# Patient Record
Sex: MALE | Race: WHITE | NOT HISPANIC OR LATINO | Employment: UNEMPLOYED | URBAN - METROPOLITAN AREA
[De-identification: names, ages, dates, MRNs, and addresses within clinical notes are randomized per-mention and may not be internally consistent; named-entity substitution may affect disease eponyms.]

---

## 2018-06-12 ENCOUNTER — HISTORICAL (OUTPATIENT)
Dept: SURGERY | Facility: HOSPITAL | Age: 48
End: 2018-06-12

## 2018-06-22 ENCOUNTER — HISTORICAL (OUTPATIENT)
Dept: RADIOLOGY | Facility: HOSPITAL | Age: 48
End: 2018-06-22

## 2018-06-25 ENCOUNTER — HISTORICAL (OUTPATIENT)
Dept: ANESTHESIOLOGY | Facility: HOSPITAL | Age: 48
End: 2018-06-25

## 2018-12-28 ENCOUNTER — HOSPITAL ENCOUNTER (OUTPATIENT)
Dept: MEDSURG UNIT | Facility: HOSPITAL | Age: 48
End: 2018-12-29
Attending: INTERNAL MEDICINE | Admitting: INTERNAL MEDICINE

## 2020-09-22 ENCOUNTER — HISTORICAL (OUTPATIENT)
Dept: RADIOLOGY | Facility: HOSPITAL | Age: 50
End: 2020-09-22

## 2020-09-23 ENCOUNTER — HISTORICAL (OUTPATIENT)
Dept: LAB | Facility: HOSPITAL | Age: 50
End: 2020-09-23

## 2021-05-27 ENCOUNTER — HISTORICAL (OUTPATIENT)
Dept: LAB | Facility: HOSPITAL | Age: 51
End: 2021-05-27

## 2022-05-02 NOTE — HISTORICAL [facility]
This is a historical note converted from Gee. Formatting and pictures may have been removed.  Please reference Gee for original formatting and attached multimedia. Chief Complaint  Pt c/o htn and chest pressure with intermittent feeling of spasms on left side of chest. Pt talking freely, skin w/d. Pt took his b/p medication today.  History of Present Illness  49yo male presents to the ED c/o chest tightness on the left.? H states that it feels like spasms on his left side.? He does describe a chronic cough which has been present for the last 1 year.? He does smoke.? He denies any fever/chills.? No N/V/D/C.? No SOB.? His BP was accelerated upon admit at 181/100.? His first set of cardiac enzymes were negative.? CXR and EKG were negative.? He will be admitted for serial enzymes and telemetry monitoring.  Review of Systems  ?  ?????Constitutional: ?No fever, No chills, No sweats, No weakness, No fatigue. ?  ?????Eye: ?No double vision, No dry eyes, No photophobia. ?  ?????Ear/Nose/Mouth/Throat: ?No ear pain, No nasal congestion, No sore throat. ?  ?????Respiratory: ?No shortness of breath, No cough, No sputum production, No hemoptysis, No wheezing, No pleuritic pain. ?  ?????Cardiovascular:??+ chest pain, No palpitations, No peripheral edema, No syncope. ?  ?????Gastrointestinal: ?No nausea, No vomiting, No diarrhea, No constipation, No heartburn, No abdominal pain. ?  ?????Genitourinary: ?No dysuria, No hematuria, No change in urine stream. ?  ?????Hematology/Lymphatics: ?No anemia, No bruising tendency, No bruise, No hemorrhage, No petechiae. ?  ?????Endocrine: ?No excessive thirst, No polyuria, No cold intolerance. ?  ?????Immunologic: ?No recurrent fevers, No recurrent infections. ?  ?????Musculoskeletal: ?Joint pain, No back pain, No muscle pain, No decreased range of motion. ?  ?????Integumentary: ?No rash, No pruritus, No petechiae, No skin lesion. ?  ?????Neurologic: ?Alert and oriented X4, No abnormal  balance, No confusion, No tingling. ?  ?????Psychiatric: ?No anxiety, No depression. ?  Physical Exam  Vitals & Measurements  T:?37.0? ?C (Temporal Artery)? TMIN:?36.4? ?C (Oral)? TMAX:?37.0? ?C (Temporal Artery)? HR:?78(Monitored)? RR:?18? BP:?146/84? SpO2:?97%? WT:?88.6?kg?  General: ?Alert and oriented, No acute distress. ?  ??????? ? Appearance: Well nourished. ?  ??????? ? Behavior: Appropriate. ?  ??????? ? Skin: Normal for ethnicity. ?  ?????Eye: ?Pupils are equal, round and reactive to light, Extraocular movements are intact. ?  ?????HENT: ?Normocephalic, Normal hearing, Oral mucosa is moist, No pharyngeal erythema. ?  ?????Neck: ?Supple, Non-tender, No carotid bruit, No jugular venous distention, No lymphadenopathy, No thyromegaly. ?  ?????Respiratory: ?Lungs are clear to auscultation, Breath sounds are equal, No chest wall tenderness. ?  ?????Cardiovascular: ?Normal rate, Regular rhythm, No murmur, No gallop, Good pulses equal in all extremities, Normal peripheral perfusion, No edema. ?  ?????Gastrointestinal: ?Soft, Non-tender, Non-distended, Normal bowel sounds, No organomegaly. ?  ?????Genitourinary: ?No costovertebral angle tenderness, No urethral discharge. ?  ?????Lymphatics: ?No lymphadenopathy neck, axilla, groin. ?  ?????Musculoskeletal: ?Normal range of motion, Normal strength, No tenderness, No swelling, Normal gait. ?  ?????Integumentary: ?Warm, Dry, Pink, Intact, No rash. ?tattoos  ?????Neurologic: ?Alert, Oriented, Normal sensory, Normal motor function, No focal deficits, Cranial Nerves II-XII are grossly intact. ?  ?????Psychiatric: ?Cooperative, Appropriate mood & affect, Normal judgment. ?  Assessment/Plan  Atypical chest pain?R07.89  Chest pressure - Adult?[id number]  Cough?R05  HTN - Hypertension?[id number]  Tobacco user?Z72.0  Type 2 diabetes mellitus, uncontrolled?E11.65  admit to observation  serial cardiac enzymes  telemetry  review  home meds  advised to stop smoking(cessation=5mins)  will increased basal insulin  DVT prophylaxis  follow labs   Problem List/Past Medical History  Ongoing  DM (diabetes mellitus)  HTN (hypertension)  Tobacco user  Historical  No qualifying data  Procedure/Surgical History  Colonoscopy (06/25/2018)  corrective eye  egd   Medications  Inpatient  aspirin 81 mg oral Delayed Release (EC) tablet, 81 mg= 1 tab(s), Oral, Daily  Coreg 6.25 mg oral tablet, 6.25 mg= 1 tab(s), Oral, BIDWMeal  Dextrose 50% and Water (50 mL vial/syringe), 25 gm= 50 mL, IV Push, As Directed, PRN  Dextrose 50% and Water (50 mL vial/syringe), 12.5 gm= 25 mL, IV Push, As Directed, PRN  Dextrose 50% and Water (50 mL vial/syringe), 12.5 gm= 25 mL, IV Push, Once, PRN  Dextrose 50% in Water intravenous solution, 12.5 gm= 25 mL, IV Push, As Directed, PRN  glucagon, 1 mg= 1 EA, IM, q10min, PRN  glucagon, 1 mg= 1 EA, IM, q10min, PRN  hydrALAZINE (Apresoline) Inj., 10 mg= 0.5 mL, IV Push, q2hr, PRN  hydrALAZINE (Apresoline) Inj., 20 mg= 1 mL, IV Push, q2hr, PRN  insulin lispro, 2-14 units, Subcutaneous, As Directed, PRN  labetalol 5 mg/mL intravenous solution, 20 mg= 4 mL, IV Push, q2hr, PRN  labetalol 5 mg/mL intravenous solution, 10 mg= 2 mL, IV Push, q2hr, PRN  Lantus 100 units/mL subcutaneous inj., 30 units= 0.3 mL, Subcutaneous, qPM  levothyroxine 25 mcg (0.025 mg) oral tablet, 25 mcg= 1 tab(s), Oral, qAM  morphine, 4 mg= 1 mL, IV, q4hr, PRN  Nitro-Bid 2% transdermal oint, 1 in, TOP, q2qr-Tmmgc  Normal Saline (0.9% NS) IV 1,000 mL, 1000 mL, IV  NS (0.9% Sodium Chloride) Infusion 1,000 mL, 1000 mL, IV  Ventolin HFA 90 mcg/inh inhalation aerosol, 1 puff, INH, BID  Zofran, 4 mg= 2 mL, IV Push, q4hr, PRN  Home  ASPIRIN 81MG EC LOW DOSE TABLETS, 81 mg= 1 tab(s), Oral, qAM  ATORVASTATIN 40MG TABLETS, 40 mg= 1 tab(s), Oral, qPM  BASAGLAR INJ 100UNIT, 30 units, Subcutaneous, qPM  LEVOTHYROXIN TAB 25MCG, 25 mcg= 1 tab(s), Oral, qAM  LOSARTAN 50MG TABLETS, 50  mg= 1 tab(s), Oral, qAM  METFORMIN TAB 1000MG, 1000 mg= 1 tab(s), Oral, BID  VENTOLIN HFA AER, 1 puff, INH, BID  Allergies  No Known Medication Allergies  Social History  Alcohol  Past, 09/24/2017  Employment/School  Employed, Work/School description: ., 06/12/2018  Exercise  Home/Environment  Lives with uncle., 06/12/2018  Nutrition/Health  Regular, 06/12/2018  Sexual  Sexual orientation: Heterosexual., 06/12/2018  Substance Abuse  Never, 09/24/2017  Tobacco  Current every day smoker, No, 12/29/2018  Current every day smoker, Cigarettes, Yes, 40 per day., 12/28/2018  Family History  Primary malignant neoplasm of lung: Father.  Thyroid disease 07-JUN-2017 17:52:08<$>: Mother.  Immunizations  Vaccine Date Status Comments   influenza virus vaccine, inactivated 12/29/2018 Given Nursing Judgment   Lab Results  Test Name Test Result Date/Time Comments   Sodium Lvl 137 mmol/L 12/28/2018 20:19 CST    Potassium Lvl 4.0 mmol/L 12/28/2018 20:19 CST    Chloride 98 mmol/L (Low) 12/28/2018 20:19 CST    CO2 30 mmol/L 12/28/2018 20:19 CST    Glucose Lvl 451 mg/dL (High) 12/28/2018 20:19 CST    BUN 18 mg/dL 12/28/2018 20:19 CST    Creatinine 1.28 mg/dL 12/28/2018 20:19 CST    Troponin-I <0.017 ng/mL 12/28/2018 20:19 CST 0.5 ng/mL is the accepted discriminant level for mycardial injury rather than a statistical normal limit for the general population.   INR 0.9 12/28/2018 20:19 CST    WBC 9.3 x10(3)/mcL 12/28/2018 20:19 CST    Hgb 15.8 gm/dL 12/28/2018 20:19 CST    Hct 46.6 % 12/28/2018 20:19 CST    Platelet 182 x10(3)/mcL 12/28/2018 20:19 CST

## 2022-05-02 NOTE — HISTORICAL [facility]
This is a historical note converted from Gee. Formatting and pictures may have been removed.  Please reference Gee for original formatting and attached multimedia. Hospital Course  49yo male admitted for chest pain/tightness.? The pain is located to his left lateral chest wall.? serial cardiac enzymes were negative and EKG was negative.? He does c/o a chronic cough for the last one year.? He also had an accelerated BP upon admit.? He is stable and ready for discharge home today.? He was advised to stop smoking.  Physical Exam  Vitals & Measurements  T:?36.8? ?C (Temporal Artery)? TMIN:?36.4? ?C (Oral)? TMAX:?37.0? ?C (Temporal Artery)? HR:?81(Monitored)? RR:?18? BP:?141/95? SpO2:?98%? WT:?88.6?kg?  General: ?Alert and oriented, No acute distress. ?  ??????? ? Appearance: Well nourished. ?  ??????? ? Behavior: Appropriate. ?  ??????? ? Skin: Normal for ethnicity. ?  ?????Eye: ?Pupils are equal, round and reactive to light, Extraocular movements are intact. ?  ?????HENT: ?Normocephalic, Normal hearing, Oral mucosa is moist, No pharyngeal erythema. ?  ?????Neck: ?Supple, Non-tender, No carotid bruit, No jugular venous distention, No lymphadenopathy, No thyromegaly. ?  ?????Respiratory: ?Lungs are clear to auscultation, Breath sounds are equal, No chest wall tenderness. ?  ?????Cardiovascular: ?Normal rate, Regular rhythm, No murmur, No gallop, Good pulses equal in all extremities, Normal peripheral perfusion, No edema. ?  ?????Gastrointestinal: ?Soft, Non-tender, Non-distended, Normal bowel sounds, No organomegaly. ?  ?????Genitourinary: ?No costovertebral angle tenderness, No urethral discharge. ?  ?????Lymphatics: ?No lymphadenopathy neck, axilla, groin. ?  ?????Musculoskeletal: ?Normal range of motion, Normal strength, No tenderness, No swelling, Normal gait. ?  ?????Integumentary: ?Warm, Dry, Pink, Intact, No rash. ?tattoos  ?????Neurologic: ?Alert, Oriented, Normal sensory, Normal motor function, No focal  deficits, Cranial Nerves II-XII are grossly intact. ?  ?????Psychiatric: ?Cooperative, Appropriate mood & affect, Normal judgment. ?  Discharge Plan  Atypical chest pain?R07.89  Chest pressure - Adult?[id number]  Cough?R05  HTN - Hypertension?[id number]  Tobacco user?Z72.0  Type 2 diabetes mellitus, uncontrolled?E11.65  Orders:  carvedilol, 6.25 mg = 1 tab(s), Oral, BIDWMeal, # 60 tab(s), 3 Refill(s)  insulin glargine, 40 units, Subcutaneous, qPM, # 15 mL, 2 Refill(s)  losartan, 100 mg = 1 tab(s), Oral, Daily, # 30 tab(s), 3 Refill(s)  Discharge, 12/29/18 12:58:00 CST, Home  Discharge Activity, Activity as Tolerated, no smoking  Discharge Diet, Diabetic  D/C=34mins  Patient Discharge Condition  stable  Discharge Disposition  home

## 2022-07-24 ENCOUNTER — HOSPITAL ENCOUNTER (EMERGENCY)
Facility: HOSPITAL | Age: 52
Discharge: HOME | End: 2022-07-24
Attending: INTERNAL MEDICINE
Payer: MEDICAID

## 2022-07-24 VITALS
DIASTOLIC BLOOD PRESSURE: 72 MMHG | RESPIRATION RATE: 18 BRPM | TEMPERATURE: 98 F | OXYGEN SATURATION: 98 % | WEIGHT: 178.38 LBS | HEIGHT: 69 IN | SYSTOLIC BLOOD PRESSURE: 134 MMHG | HEART RATE: 79 BPM | BODY MASS INDEX: 26.42 KG/M2

## 2022-07-24 DIAGNOSIS — U07.1 COVID-19: Primary | ICD-10-CM

## 2022-07-24 LAB
FLUAV AG UPPER RESP QL IA.RAPID: NOT DETECTED
FLUBV AG UPPER RESP QL IA.RAPID: NOT DETECTED
SARS-COV-2 RNA RESP QL NAA+PROBE: DETECTED

## 2022-07-24 PROCEDURE — 99283 EMERGENCY DEPT VISIT LOW MDM: CPT

## 2022-07-24 PROCEDURE — 87636 SARSCOV2 & INF A&B AMP PRB: CPT | Performed by: NURSE PRACTITIONER

## 2022-07-24 RX ORDER — ONDANSETRON 4 MG/1
4 TABLET, FILM COATED ORAL EVERY 8 HOURS PRN
Qty: 21 TABLET | Refills: 0 | Status: SHIPPED | OUTPATIENT
Start: 2022-07-24 | End: 2022-07-31

## 2022-07-24 NOTE — Clinical Note
"Oz"Hugo Sher was seen and treated in our emergency department on 7/24/2022.     COVID-19 is present in our communities across the state. There is limited testing for COVID at this time, so not all patients can be tested. In this situation, your employee meets the following criteria:    Oz Sher Jr. has met the criteria for COVID-19 testing based upon symptoms, travel, and/or potential exposure. The test has been completed and is pending results at this time. During this time the employee is not able to work and should be quarantined per the Centers for Disease Control timelines.     If you have any questions or concerns, or if I can be of further assistance, please do not hesitate to contact me.    Sincerely,             SHIREEN Beach RN"

## 2022-07-24 NOTE — Clinical Note
"Oz"Hugo Sher was seen and treated in our emergency department on 7/24/2022.     COVID-19 is present in our communities across the state. There is limited testing for COVID at this time, so not all patients can be tested. In this situation, your employee meets the following criteria:    Oz Sher Jr. has met the criteria for COVID-19 testing and has a POSITIVE result. He can return to work once they are asymptomatic for 24 hours without the use of fever reducing medications AND at least five days from the first positive result. A mask is recommended for 5 days post quarantine.     If you have any questions or concerns, or if I can be of further assistance, please do not hesitate to contact me.    Sincerely,             SHIREEN Beach RN"

## 2022-07-25 NOTE — ED PROVIDER NOTES
"     Source of History:  Patient    Chief complaint:  Generalized Body Aches (C/o body aches, lethargy, and nausea since yesterday. Loss of taste. )      HPI:  Oz Sher Jr. is a 52 y.o. male presenting with body aches, fatigue, loss of taste that started yesterday.  Patient denies any known sick contacts.  Patient also does report some nausea but no vomiting.  Patient has been running an intermittent fever however he is afebrile at this time.    This is the extent to the patients complaints today here in the emergency department.    ROS: As per HPI and below:  General:  Fever.  No chills.  Eyes: No visual changes.  ENT: No sore throat. No ear pain  Head:  Headache  Chest: No shortness of breath. No cough.  Cardiovascular: No chest pain.  Abdomen: No abdominal pain.  Nausea.  Genito-Urinary: No abnormal urination.  Neurologic: No focal weakness.  No numbness.  MSK: No myalgias. No arthralgias.   Integument: No rashes or lesions.  Psych: No confusion      Review of patient's allergies indicates:  No Known Allergies    PMH:  As per HPI and below:  Past Medical History:   Diagnosis Date    Diabetes mellitus     DVT (deep venous thrombosis)      No past surgical history on file.    Social History     Tobacco Use    Smoking status: Current Every Day Smoker     Packs/day: 1.00    Smokeless tobacco: Never Used   Substance Use Topics    Drug use: Never       Physical Exam:    /76   Pulse 80   Temp 98.9 °F (37.2 °C)   Resp 18   Ht 5' 9" (1.753 m)   Wt 80.9 kg (178 lb 6.4 oz)   SpO2 98%   BMI 26.35 kg/m²   Nursing note and vital signs reviewed.  Appearance: Afebrile. Not toxic appearing. No acute distress.  Head: Atraumatic  Eyes: No conjunctival injection. No scleral icterus  ENT: Normal phonation  Chest/ Respiratory: No respiratory distress. No accessory muscle use.  Cardiovascular: Regular rate   Abdomen:  Not distended.    Musculoskeletal: Good range of motion all joints.  No deformities.  Neck " supple.  No meningismus.  Skin: No rashes seen.  Good turgor.  No ecchymoses.  Neurologic: GCS 15. Ambulates with a steady gait.   Mental Status:  Alert and oriented x 3.  Appropriate, conversant    Labs that have been ordered have been independently reviewed and interpreted by myself.    I decided to obtain the patient's medical records.  Summary of Medical Records:  Nursing documentation    Initial Impression/ Differential Dx:  COVID, flu, strep,    MDM:    52 y.o. male with body aches, fatigue, nausea this started yesterday presents emergency department for evaluation.  He reports loss of taste as well.  Patient is in no acute distress at this time.  He has no respiratory distress.  He denies any chest pain shortness of breath.  No cough.  Main complaint today is that his taste has been altered since feeling ill and his liquids and food all have altered taste.  COVID swab was done prior to me assuming care of this patient and is positive.                   Diagnostic Impression:    1. COVID-19         ED Disposition Condition    Discharge Stable          ED Prescriptions     Medication Sig Dispense Start Date End Date Auth. Provider    ondansetron (ZOFRAN) 4 MG tablet Take 1 tablet (4 mg total) by mouth every 8 (eight) hours as needed for Nausea. 21 tablet 7/24/2022 7/31/2022 CAROLANN Serrano        Follow-up Information     Follow up With Specialties Details Why Contact Info    Almas Crockett MD Family Medicine Call  For ER Follow Up. [address] [place] [address]  [phone]             CAROLANN Serrano  07/24/22 3720

## 2022-10-31 ENCOUNTER — HOSPITAL ENCOUNTER (EMERGENCY)
Facility: HOSPITAL | Age: 52
Discharge: HOME | End: 2022-11-01
Attending: GENERAL ACUTE CARE HOSPITAL
Payer: MEDICAID

## 2022-10-31 DIAGNOSIS — S92.424A CLOSED NONDISPLACED FRACTURE OF DISTAL PHALANX OF RIGHT GREAT TOE, INITIAL ENCOUNTER: Primary | ICD-10-CM

## 2022-10-31 DIAGNOSIS — T14.90XA TRAUMA: ICD-10-CM

## 2022-10-31 PROCEDURE — 25000003 PHARM REV CODE 250: Performed by: GENERAL ACUTE CARE HOSPITAL

## 2022-10-31 PROCEDURE — 99283 EMERGENCY DEPT VISIT LOW MDM: CPT

## 2022-10-31 RX ORDER — IBUPROFEN 400 MG/1
800 TABLET ORAL
Status: COMPLETED | OUTPATIENT
Start: 2022-10-31 | End: 2022-10-31

## 2022-10-31 RX ADMIN — IBUPROFEN 800 MG: 400 TABLET ORAL at 11:10

## 2022-11-01 VITALS
TEMPERATURE: 98 F | BODY MASS INDEX: 27.11 KG/M2 | SYSTOLIC BLOOD PRESSURE: 162 MMHG | WEIGHT: 183.63 LBS | HEART RATE: 88 BPM | RESPIRATION RATE: 18 BRPM | OXYGEN SATURATION: 98 % | DIASTOLIC BLOOD PRESSURE: 80 MMHG

## 2022-11-01 PROCEDURE — 25000003 PHARM REV CODE 250: Performed by: GENERAL ACUTE CARE HOSPITAL

## 2022-11-01 RX ORDER — HYDROCODONE BITARTRATE AND ACETAMINOPHEN 5; 325 MG/1; MG/1
1 TABLET ORAL
Status: COMPLETED | OUTPATIENT
Start: 2022-11-01 | End: 2022-11-01

## 2022-11-01 RX ORDER — HYDROCODONE BITARTRATE AND ACETAMINOPHEN 5; 325 MG/1; MG/1
1 TABLET ORAL EVERY 8 HOURS PRN
Qty: 9 TABLET | Refills: 0 | Status: SHIPPED | OUTPATIENT
Start: 2022-11-01 | End: 2022-11-04

## 2022-11-01 RX ADMIN — HYDROCODONE BITARTRATE AND ACETAMINOPHEN 1 TABLET: 5; 325 TABLET ORAL at 12:11

## 2022-11-01 NOTE — ED PROVIDER NOTES
Encounter Date: 10/31/2022       History     Chief Complaint   Patient presents with    Toe Pain     C/o great toe pain on R foot since dropping AC unit onto foot yesterday.       Patient came in ER with chief complaint right great toe pain since yesterday, reports that he feels have a subject of the right toe, additionally kicked disease sub shock with same foot, complains of the right great toe pole since then, no fever, limping with walking    Review of patient's allergies indicates:  No Known Allergies  Past Medical History:   Diagnosis Date    Diabetes mellitus     DVT (deep venous thrombosis)      History reviewed. No pertinent surgical history.  History reviewed. No pertinent family history.  Social History     Tobacco Use    Smoking status: Every Day     Packs/day: 1.00     Types: Cigarettes    Smokeless tobacco: Never   Substance Use Topics    Drug use: Never     Review of Systems   Musculoskeletal:  Positive for arthralgias and joint swelling.   All other systems reviewed and are negative.    Physical Exam     Initial Vitals [10/31/22 2337]   BP Pulse Resp Temp SpO2   (!) 179/88 92 18 98.1 °F (36.7 °C) 97 %      MAP       --         Physical Exam    Nursing note and vitals reviewed.  Constitutional: He appears well-developed.   HENT:   Head: Normocephalic.   Eyes: EOM are normal.   Cardiovascular:  Normal rate and regular rhythm.           Pulmonary/Chest: Breath sounds normal.   Abdominal: Abdomen is soft. Bowel sounds are normal.   Musculoskeletal:         General: Tenderness and edema present.      Right foot: Decreased range of motion. Swelling and tenderness present.      Comments:  Right great toe mild edema with erythema, a limited ROM, tender on palpation     Neurological: He is oriented to person, place, and time.   Skin: Skin is warm and dry.   Psychiatric: He has a normal mood and affect.       ED Course   Procedures  Labs Reviewed - No data to display       Imaging Results              X-Ray  Foot Complete Right (In process)                   X-Rays:   Independently Interpreted Readings:   Other Readings:   Right foot x-ray preliminary report: Revealed 1st distal phalanx fracture  Medications   ibuprofen tablet 800 mg (800 mg Oral Given 10/31/22 2264)   HYDROcodone-acetaminophen 5-325 mg per tablet 1 tablet (1 tablet Oral Given 11/1/22 0031)     Medical Decision Making:   Initial Assessment:    Patient came in his right great toe pain after mild trauma yesterday, limping with walking  Differential Diagnosis:    Right great toe contusion, fracture  Clinical Tests:   Radiological Study: Ordered and Reviewed  ED Management:   X-ray of right foot ( preliminary report), revealed right 1st distal phalanx fracture, patient will be placed in a hard shoe, follow-up with orthopedics with pain medication                        Clinical Impression:   Final diagnoses:  [T14.90XA] Trauma  [S92.424A] Closed nondisplaced fracture of distal phalanx of right great toe, initial encounter (Primary)        ED Disposition Condition    Discharge Stable          ED Prescriptions       Medication Sig Dispense Start Date End Date Auth. Provider    HYDROcodone-acetaminophen (NORCO) 5-325 mg per tablet Take 1 tablet by mouth every 8 (eight) hours as needed for Pain. 9 tablet 11/1/2022 11/4/2022 Myriam Perez MD          Follow-up Information       Follow up With Specialties Details Why Contact Info    Almas Crockett MD Family Medicine In 3 days  [address] [place] [address]  [phone]               Myriam Perez MD  11/01/22 0032

## 2022-11-01 NOTE — DISCHARGE INSTRUCTIONS
Follow-up with orthopedics in 2-3 days for re-examination evaluation, use ibuprofen/ Motrin every 8 hours as needed

## 2022-12-23 ENCOUNTER — HOSPITAL ENCOUNTER (OUTPATIENT)
Dept: RADIOLOGY | Facility: HOSPITAL | Age: 52
Discharge: HOME | End: 2022-12-23
Attending: PEDIATRICS
Payer: MEDICAID

## 2022-12-23 DIAGNOSIS — J20.9 ACUTE BRONCHITIS: ICD-10-CM

## 2022-12-23 PROCEDURE — 71046 X-RAY EXAM CHEST 2 VIEWS: CPT | Mod: TC

## 2022-12-29 ENCOUNTER — LAB VISIT (OUTPATIENT)
Dept: LAB | Facility: HOSPITAL | Age: 52
End: 2022-12-29
Attending: FAMILY MEDICINE
Payer: MEDICAID

## 2022-12-29 DIAGNOSIS — E78.00 HIGH CHOLESTEROL: Primary | ICD-10-CM

## 2022-12-29 LAB
HEMOCCULT SP1 STL QL: NEGATIVE
HEMOCCULT SP2 STL QL: NEGATIVE
HEMOCCULT SP3 STL QL: NEGATIVE

## 2022-12-29 PROCEDURE — 82270 OCCULT BLOOD FECES: CPT

## 2023-03-17 ENCOUNTER — PATIENT MESSAGE (OUTPATIENT)
Dept: RESEARCH | Facility: HOSPITAL | Age: 53
End: 2023-03-17
Payer: MEDICAID

## 2023-06-26 ENCOUNTER — HOSPITAL ENCOUNTER (EMERGENCY)
Facility: HOSPITAL | Age: 53
Discharge: HOME | End: 2023-06-26
Attending: INTERNAL MEDICINE
Payer: MEDICAID

## 2023-06-26 VITALS
HEIGHT: 69 IN | TEMPERATURE: 98 F | OXYGEN SATURATION: 98 % | HEART RATE: 87 BPM | DIASTOLIC BLOOD PRESSURE: 76 MMHG | WEIGHT: 190 LBS | RESPIRATION RATE: 16 BRPM | SYSTOLIC BLOOD PRESSURE: 139 MMHG | BODY MASS INDEX: 28.14 KG/M2

## 2023-06-26 DIAGNOSIS — S46.812A STRAIN OF LEFT TRAPEZIUS MUSCLE, INITIAL ENCOUNTER: Primary | ICD-10-CM

## 2023-06-26 PROCEDURE — 99284 EMERGENCY DEPT VISIT MOD MDM: CPT

## 2023-06-26 RX ORDER — CYCLOBENZAPRINE HCL 10 MG
10 TABLET ORAL 3 TIMES DAILY PRN
Qty: 15 TABLET | Refills: 0 | Status: SHIPPED | OUTPATIENT
Start: 2023-06-26 | End: 2023-07-01

## 2023-06-26 RX ORDER — HYDROCODONE BITARTRATE AND ACETAMINOPHEN 7.5; 325 MG/1; MG/1
1 TABLET ORAL EVERY 6 HOURS PRN
Qty: 16 TABLET | Refills: 0 | Status: SHIPPED | OUTPATIENT
Start: 2023-06-26

## 2023-06-26 NOTE — ED PROVIDER NOTES
Encounter Date: 6/26/2023       History     Chief Complaint   Patient presents with    Back Pain     Upper left sided back pain around his scapula for 3 days. States when he bends down his back will lock up. States he was lifting some boxes which could have caused the pain     See MDM    The history is provided by the patient. No  was used.   Review of patient's allergies indicates:  No Known Allergies  Past Medical History:   Diagnosis Date    Diabetes mellitus     DVT (deep venous thrombosis)      History reviewed. No pertinent surgical history.  History reviewed. No pertinent family history.  Social History     Tobacco Use    Smoking status: Every Day     Packs/day: 1.00     Types: Cigarettes    Smokeless tobacco: Never   Substance Use Topics    Drug use: Never     Review of Systems   Constitutional:  Negative for fever.   Respiratory:  Negative for cough and shortness of breath.    Cardiovascular:  Negative for chest pain.   Gastrointestinal:  Negative for abdominal pain.   Genitourinary:  Negative for difficulty urinating and dysuria.   Musculoskeletal:  Positive for back pain. Negative for gait problem.   Skin:  Negative for color change.   Neurological:  Negative for dizziness, speech difficulty and headaches.   Psychiatric/Behavioral:  Negative for hallucinations and suicidal ideas.    All other systems reviewed and are negative.    Physical Exam     Initial Vitals [06/26/23 1202]   BP Pulse Resp Temp SpO2   139/76 87 16 97.8 °F (36.6 °C) 98 %      MAP       --         Physical Exam    Nursing note and vitals reviewed.  Constitutional: He appears well-developed and well-nourished.   HENT:   Head: Normocephalic.   Eyes: EOM are normal.   Neck: Neck supple.   Normal range of motion.  Cardiovascular:  Normal rate, regular rhythm, normal heart sounds and intact distal pulses.           Pulmonary/Chest: Breath sounds normal.   Abdominal: Abdomen is soft. Bowel sounds are normal.    Musculoskeletal:         General: Normal range of motion.      Cervical back: Normal range of motion and neck supple.      Comments: Tenderness to left trapezius     Neurological: He is alert and oriented to person, place, and time. He has normal strength.   Skin: Skin is warm and dry. Capillary refill takes less than 2 seconds.   Psychiatric: He has a normal mood and affect. His behavior is normal. Judgment and thought content normal.       ED Course   Procedures  Labs Reviewed - No data to display       Imaging Results    None          Medications - No data to display  Medical Decision Making:   Initial Assessment:   Historian:  Patient.  Patient is a 53-year-old male  that presents with left upper back pain that has been present few days. Associated symptoms nothing. Surrounding information is patient states he was lifting some boxes in injured his left upper back. Exacerbated by nothing. Relieved by nothing. Patient treatment prior to arrival none. Risk factors include none. Other history pertaining to this complaint nothing.   Assessment:  See physical exam.    Differential Diagnosis:   Back strain, back sprain, muscle spasm  ED Management:  History was obtained.  Physical was performed.  Patient has some tenderness to the left trapezius.  I will put him on some pain medication with a muscle relaxer.  He is also to use warm compresses to the area.  He is to follow up with his PCP in a few days for recheck.  No medical or surgical consult indicated in ER.  No social determinants that affect healthcare were noted.                        Clinical Impression:   Final diagnoses:  [S48.880Y] Strain of left trapezius muscle, initial encounter (Primary)        ED Disposition Condition    Discharge Stable          ED Prescriptions       Medication Sig Dispense Start Date End Date Auth. Provider    HYDROcodone-acetaminophen (NORCO) 7.5-325 mg per tablet Take 1 tablet by mouth every 6 (six) hours as needed for Pain. 16  tablet 6/26/2023 -- CAROLANN Villalpando    cyclobenzaprine (FLEXERIL) 10 MG tablet Take 1 tablet (10 mg total) by mouth 3 (three) times daily as needed for Muscle spasms. 15 tablet 6/26/2023 7/1/2023 CAROLANN Villalpando          Follow-up Information       Follow up With Specialties Details Why Contact Info    Your Primary Care Provider  Call in 3 days ed follow up              CAROLANN Villalpando  06/26/23 1210

## 2023-07-05 ENCOUNTER — HOSPITAL ENCOUNTER (EMERGENCY)
Facility: HOSPITAL | Age: 53
Discharge: HOME | End: 2023-07-05
Attending: INTERNAL MEDICINE
Payer: MEDICAID

## 2023-07-05 VITALS
BODY MASS INDEX: 26.57 KG/M2 | DIASTOLIC BLOOD PRESSURE: 78 MMHG | RESPIRATION RATE: 18 BRPM | SYSTOLIC BLOOD PRESSURE: 160 MMHG | TEMPERATURE: 98 F | WEIGHT: 179.38 LBS | HEIGHT: 69 IN | HEART RATE: 71 BPM | OXYGEN SATURATION: 99 %

## 2023-07-05 DIAGNOSIS — M25.512 PAIN IN JOINT OF LEFT SHOULDER: Primary | ICD-10-CM

## 2023-07-05 DIAGNOSIS — R52 PAIN: ICD-10-CM

## 2023-07-05 DIAGNOSIS — M62.830 BACK MUSCLE SPASM: ICD-10-CM

## 2023-07-05 PROCEDURE — 96372 THER/PROPH/DIAG INJ SC/IM: CPT | Performed by: NURSE PRACTITIONER

## 2023-07-05 PROCEDURE — 63600175 PHARM REV CODE 636 W HCPCS: Performed by: NURSE PRACTITIONER

## 2023-07-05 PROCEDURE — 99284 EMERGENCY DEPT VISIT MOD MDM: CPT

## 2023-07-05 RX ORDER — DEXAMETHASONE SODIUM PHOSPHATE 4 MG/ML
4 INJECTION, SOLUTION INTRA-ARTICULAR; INTRALESIONAL; INTRAMUSCULAR; INTRAVENOUS; SOFT TISSUE
Status: COMPLETED | OUTPATIENT
Start: 2023-07-05 | End: 2023-07-05

## 2023-07-05 RX ORDER — KETOROLAC TROMETHAMINE 30 MG/ML
60 INJECTION, SOLUTION INTRAMUSCULAR; INTRAVENOUS
Status: COMPLETED | OUTPATIENT
Start: 2023-07-05 | End: 2023-07-05

## 2023-07-05 RX ORDER — MELOXICAM 15 MG/1
15 TABLET ORAL DAILY
Qty: 30 TABLET | Refills: 0 | Status: SHIPPED | OUTPATIENT
Start: 2023-07-05 | End: 2023-08-04

## 2023-07-05 RX ORDER — BACLOFEN 10 MG/1
10 TABLET ORAL 3 TIMES DAILY
Qty: 21 TABLET | Refills: 0 | Status: SHIPPED | OUTPATIENT
Start: 2023-07-05 | End: 2023-07-12

## 2023-07-05 RX ADMIN — KETOROLAC TROMETHAMINE 60 MG: 30 INJECTION, SOLUTION INTRAMUSCULAR at 09:07

## 2023-07-05 RX ADMIN — DEXAMETHASONE SODIUM PHOSPHATE 4 MG: 4 INJECTION, SOLUTION INTRA-ARTICULAR; INTRALESIONAL; INTRAMUSCULAR; INTRAVENOUS; SOFT TISSUE at 09:07

## 2023-07-06 NOTE — ED PROVIDER NOTES
Encounter Date: 7/5/2023       History     Chief Complaint   Patient presents with    Arm Pain     Left arm pain radiating to fingers x1.5 weeks. Seen in this ED for same complaint. Taking muscle relaxer and pain medication with no relief.      Patient is a 53-year-old male presents emerged department complaints of left shoulder pain for few weeks.  He states he was seen here for the same was given medications with no relief.  He denies having any images done here he.  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  He denies any other injury trauma.  He denies any previous injury to the shoulder.  The other complaints or associated symptoms.    Review of patient's allergies indicates:  No Known Allergies  Past Medical History:   Diagnosis Date    Diabetes mellitus     DVT (deep venous thrombosis)      History reviewed. No pertinent surgical history.  History reviewed. No pertinent family history.  Social History     Tobacco Use    Smoking status: Every Day     Packs/day: 1.00     Types: Cigarettes    Smokeless tobacco: Never   Substance Use Topics    Drug use: Never     Review of Systems   Constitutional:  Negative for activity change, appetite change and fever.   HENT:  Negative for congestion, dental problem and sore throat.    Eyes:  Negative for discharge and itching.   Respiratory:  Negative for apnea, chest tightness and shortness of breath.    Cardiovascular:  Negative for chest pain.   Gastrointestinal:  Negative for nausea.   Endocrine: Negative for cold intolerance and heat intolerance.   Genitourinary:  Negative for decreased urine volume, dysuria, testicular pain and urgency.   Musculoskeletal:  Positive for myalgias. Negative for back pain.   Skin:  Negative for rash.   Neurological:  Negative for dizziness, facial asymmetry and weakness.   Hematological:  Does not bruise/bleed easily.    Psychiatric/Behavioral:  Negative for agitation and behavioral problems.    All other systems reviewed and are negative.    Physical Exam     Initial Vitals [07/05/23 2122]   BP Pulse Resp Temp SpO2   (!) 164/80 79 18 98.1 °F (36.7 °C) 99 %      MAP       --         Physical Exam    Nursing note and vitals reviewed.  Constitutional: Vital signs are normal. He appears well-developed and well-nourished.  Non-toxic appearance. He does not have a sickly appearance.   HENT:   Head: Normocephalic and atraumatic.   Right Ear: External ear normal.   Left Ear: External ear normal.   Eyes: Conjunctivae, EOM and lids are normal. Lids are everted and swept, no foreign bodies found.   Neck: Trachea normal and phonation normal. Neck supple. No thyroid mass and no thyromegaly present.   Normal range of motion.   Full passive range of motion without pain.     Cardiovascular:  Normal rate, regular rhythm, S1 normal, S2 normal, normal heart sounds, intact distal pulses and normal pulses.           Musculoskeletal:         General: Tenderness present. No edema.      Cervical back: Full passive range of motion without pain, normal range of motion and neck supple.      Comments: Point tenderness to the left scapula in 2 different spots upper and lower to the left back.  No obvious signs of trauma including any bulging deformity or signs of trauma to the skin.     Lymphadenopathy:     He has no cervical adenopathy.   Neurological: He is alert and oriented to person, place, and time. He has normal strength.   Skin: Skin is warm, dry and intact. Capillary refill takes less than 2 seconds.   Psychiatric: He has a normal mood and affect. His speech is normal and behavior is normal. Judgment normal. Cognition and memory are normal.       ED Course   Procedures  Labs Reviewed - No data to display       Imaging Results              X-Ray Shoulder Trauma Left (Final result)  Result time 07/05/23 21:44:05      Final result by Yared Rowe,  MD (07/05/23 21:44:05)                   Impression:      As above.      Electronically signed by: Yared Rowe  Date:    07/05/2023  Time:    21:44               Narrative:    EXAMINATION:  XR SHOULDER TRAUMA 3 VIEW LEFT    CLINICAL HISTORY:  Pain, unspecified    TECHNIQUE:  Three views of the left shoulder were performed.    COMPARISON  06/18/2020    FINDINGS:  Degenerative changes with osteophyte formation.  No displaced fracture.  The glenohumeral articulation is congruent.                                       Medications   ketorolac injection 60 mg (60 mg Intramuscular Given 7/5/23 2141)   dexAMETHasone injection 4 mg (4 mg Intramuscular Given 7/5/23 2141)                       Medical Decision Making  Patient is a 53-year-old male presents emerged department complaints of left shoulder pain.  He states this going on for over a week and states he was seen at this facility and given medications which did not help.  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  Denies any in the anterior or trauma.  Pain is worse with range of motion but denies any swelling or deformity of the shoulder.    Problems Addressed:  Back muscle spasm: acute illness or injury     Details: Pain that is localized left back and there is point tenderness over the muscles palpation.  I feel like these was spasms however he does have some arthritis seen on x-ray.  Discussed PCP follow-up as needed for possible referral for physical therapy or MRI for further imaging of the shoulder.  Strict ED return precautions discussed.  Discussed oral medication use on outpatient basis for pain.  Pain in joint of left shoulder: acute illness or injury     Details: Pain that is localized left back and there is point tenderness over the muscles palpation.  I feel like these was spasms however he does  have some arthritis seen on x-ray.  Discussed PCP follow-up as needed for possible referral for physical therapy or MRI for further imaging of the shoulder.  Strict ED return precautions discussed.  Discussed oral medication use on outpatient basis for pain.    Amount and/or Complexity of Data Reviewed  External Data Reviewed: notes.  Radiology: ordered. Decision-making details documented in ED Course.            Clinical Impression:   Final diagnoses:  [R52] Pain  [M25.512] Pain in joint of left shoulder (Primary)  [M62.830] Back muscle spasm        ED Disposition Condition    Discharge Stable          ED Prescriptions       Medication Sig Dispense Start Date End Date Auth. Provider    baclofen (LIORESAL) 10 MG tablet Take 1 tablet (10 mg total) by mouth 3 (three) times daily. for 7 days 21 tablet 7/5/2023 7/12/2023 CAROLANN Serrano    meloxicam (MOBIC) 15 MG tablet Take 1 tablet (15 mg total) by mouth once daily. 30 tablet 7/5/2023 8/4/2023 CAROLANN Serrano          Follow-up Information       Follow up With Specialties Details Why Contact Info    Almas Crockett MD Family Medicine Schedule an appointment as soon as possible for a visit  As needed, For ER Follow Up. [address] [place] [address]  [phone]               CAROLANN Serrano  07/05/23 4617

## 2023-07-28 ENCOUNTER — LAB VISIT (OUTPATIENT)
Dept: LAB | Facility: HOSPITAL | Age: 53
End: 2023-07-28
Attending: FAMILY MEDICINE
Payer: MEDICAID

## 2023-07-28 DIAGNOSIS — E78.00 HIGH CHOLESTEROL: Primary | ICD-10-CM

## 2023-07-28 LAB
ALBUMIN SERPL-MCNC: 3.5 G/DL (ref 3.5–5)
ALBUMIN/GLOB SERPL: 1 RATIO (ref 1.1–2)
ALP SERPL-CCNC: 72 UNIT/L (ref 40–150)
ALT SERPL-CCNC: 32 UNIT/L (ref 0–55)
APPEARANCE UR: CLEAR
AST SERPL-CCNC: 15 UNIT/L (ref 5–34)
BILIRUB UR QL STRIP.AUTO: NEGATIVE
BILIRUBIN DIRECT+TOT PNL SERPL-MCNC: 0.2 MG/DL
BUN SERPL-MCNC: 14 MG/DL (ref 8.4–25.7)
CALCIUM SERPL-MCNC: 9.4 MG/DL (ref 8.4–10.2)
CHLORIDE SERPL-SCNC: 105 MMOL/L (ref 98–107)
CHOLEST SERPL-MCNC: 246 MG/DL
CHOLEST/HDLC SERPL: 7 {RATIO} (ref 0–5)
CO2 SERPL-SCNC: 26 MMOL/L (ref 22–29)
COLOR UR: YELLOW
CREAT SERPL-MCNC: 1.11 MG/DL (ref 0.73–1.18)
ERYTHROCYTE [DISTWIDTH] IN BLOOD BY AUTOMATED COUNT: 11.8 % (ref 11.5–17)
EST. AVERAGE GLUCOSE BLD GHB EST-MCNC: 309.2 MG/DL
GFR SERPLBLD CREATININE-BSD FMLA CKD-EPI: >60 MLS/MIN/1.73/M2
GLOBULIN SER-MCNC: 3.4 GM/DL (ref 2.4–3.5)
GLUCOSE SERPL-MCNC: 325 MG/DL (ref 74–100)
GLUCOSE UR QL STRIP.AUTO: ABNORMAL
HBA1C MFR BLD: 12.4 %
HCT VFR BLD AUTO: 47.8 % (ref 42–52)
HDLC SERPL-MCNC: 36 MG/DL (ref 35–60)
HGB BLD-MCNC: 16 G/DL (ref 14–18)
KETONES UR QL STRIP.AUTO: ABNORMAL
LDLC SERPL CALC-MCNC: 116 MG/DL (ref 50–140)
LEUKOCYTE ESTERASE UR QL STRIP.AUTO: NEGATIVE
MCH RBC QN AUTO: 31.4 PG (ref 27–31)
MCHC RBC AUTO-ENTMCNC: 33.5 G/DL (ref 33–36)
MCV RBC AUTO: 93.7 FL (ref 80–94)
NITRITE UR QL STRIP.AUTO: NEGATIVE
PH UR STRIP.AUTO: 5 [PH]
PLATELET # BLD AUTO: 175 X10(3)/MCL (ref 130–400)
PMV BLD AUTO: 12 FL (ref 7.4–10.4)
POTASSIUM SERPL-SCNC: 4.5 MMOL/L (ref 3.5–5.1)
PROT SERPL-MCNC: 6.9 GM/DL (ref 6.4–8.3)
PROT UR QL STRIP.AUTO: NEGATIVE
PSA SERPL-MCNC: 0.23 NG/ML
RBC # BLD AUTO: 5.1 X10(6)/MCL (ref 4.7–6.1)
RBC UR QL AUTO: NEGATIVE
SODIUM SERPL-SCNC: 138 MMOL/L (ref 136–145)
SP GR UR STRIP.AUTO: 1.02
TRIGL SERPL-MCNC: 469 MG/DL (ref 34–140)
TSH SERPL-ACNC: 2.15 UIU/ML (ref 0.35–4.94)
UROBILINOGEN UR STRIP-ACNC: 0.2
VLDLC SERPL CALC-MCNC: 94 MG/DL
WBC # SPEC AUTO: 10.1 X10(3)/MCL (ref 4.5–11.5)

## 2023-07-28 PROCEDURE — 84153 ASSAY OF PSA TOTAL: CPT

## 2023-07-28 PROCEDURE — 80061 LIPID PANEL: CPT

## 2023-07-28 PROCEDURE — 81003 URINALYSIS AUTO W/O SCOPE: CPT

## 2023-07-28 PROCEDURE — 83036 HEMOGLOBIN GLYCOSYLATED A1C: CPT

## 2023-07-28 PROCEDURE — 84443 ASSAY THYROID STIM HORMONE: CPT

## 2023-07-28 PROCEDURE — 85027 COMPLETE CBC AUTOMATED: CPT

## 2023-07-28 PROCEDURE — 80053 COMPREHEN METABOLIC PANEL: CPT

## 2023-07-28 PROCEDURE — 36415 COLL VENOUS BLD VENIPUNCTURE: CPT

## 2023-08-01 ENCOUNTER — LAB VISIT (OUTPATIENT)
Dept: LAB | Facility: HOSPITAL | Age: 53
End: 2023-08-01
Attending: FAMILY MEDICINE
Payer: MEDICAID

## 2023-08-01 DIAGNOSIS — E78.00 PURE HYPERCHOLESTEROLEMIA: Primary | ICD-10-CM

## 2023-08-01 LAB
HEMOCCULT SP1 STL QL: NEGATIVE
HEMOCCULT SP2 STL QL: NEGATIVE
HEMOCCULT SP3 STL QL: NEGATIVE

## 2023-08-01 PROCEDURE — 82270 OCCULT BLOOD FECES: CPT

## 2023-08-16 DIAGNOSIS — M25.512 LEFT SHOULDER PAIN: Primary | ICD-10-CM

## 2023-08-18 ENCOUNTER — HOSPITAL ENCOUNTER (OUTPATIENT)
Dept: RADIOLOGY | Facility: HOSPITAL | Age: 53
Discharge: HOME | End: 2023-08-18
Attending: PEDIATRICS
Payer: MEDICAID

## 2023-08-18 DIAGNOSIS — M25.512 LEFT SHOULDER PAIN: ICD-10-CM

## 2023-08-18 PROCEDURE — 72141 MRI NECK SPINE W/O DYE: CPT | Mod: TC

## 2023-08-29 PROBLEM — K21.9 GASTROESOPHAGEAL REFLUX DISEASE: Status: ACTIVE | Noted: 2018-07-26

## 2023-08-29 PROBLEM — F32.A DEPRESSIVE DISORDER: Status: ACTIVE | Noted: 2018-07-26

## 2023-08-29 PROBLEM — K57.90 DIVERTICULAR DISEASE: Status: ACTIVE | Noted: 2018-07-26

## 2023-08-29 PROBLEM — E03.9 HYPOTHYROIDISM: Status: ACTIVE | Noted: 2018-07-26

## 2023-08-29 PROBLEM — R05.3 CHRONIC COUGH: Status: ACTIVE | Noted: 2018-07-26

## 2023-08-29 PROBLEM — F17.200 SMOKER: Status: ACTIVE | Noted: 2021-05-14

## 2023-08-29 PROBLEM — M54.2 CERVICALGIA: Status: ACTIVE | Noted: 2023-08-29

## 2023-08-29 PROBLEM — E78.00 HYPERCHOLESTEROLEMIA: Status: ACTIVE | Noted: 2019-10-04

## 2023-08-29 PROBLEM — M50.30 DEGENERATIVE DISC DISEASE, CERVICAL: Status: ACTIVE | Noted: 2023-08-29

## 2023-08-29 PROBLEM — I10 ESSENTIAL HYPERTENSION: Status: ACTIVE | Noted: 2019-09-26

## 2023-08-29 PROBLEM — F41.9 ANXIETY: Status: ACTIVE | Noted: 2018-07-26

## 2023-08-29 PROBLEM — J44.9 CHRONIC OBSTRUCTIVE LUNG DISEASE: Status: ACTIVE | Noted: 2018-07-26

## 2023-08-29 PROBLEM — I10 HYPERTENSION: Status: ACTIVE | Noted: 2019-10-04

## 2023-08-30 PROBLEM — R20.0 NUMBNESS AND TINGLING: Status: ACTIVE | Noted: 2023-08-30

## 2023-08-30 PROBLEM — M79.602 LEFT ARM PAIN: Status: ACTIVE | Noted: 2023-08-30

## 2023-08-30 PROBLEM — R20.2 NUMBNESS AND TINGLING: Status: ACTIVE | Noted: 2023-08-30

## 2023-09-12 ENCOUNTER — LAB VISIT (OUTPATIENT)
Dept: LAB | Facility: HOSPITAL | Age: 53
End: 2023-09-12
Attending: FAMILY MEDICINE
Payer: MEDICAID

## 2023-09-12 DIAGNOSIS — E11.9 DIABETES MELLITUS WITHOUT COMPLICATION: Primary | ICD-10-CM

## 2023-09-12 LAB
EST. AVERAGE GLUCOSE BLD GHB EST-MCNC: 248.9 MG/DL
HBA1C MFR BLD: 10.3 %

## 2023-09-12 PROCEDURE — 36415 COLL VENOUS BLD VENIPUNCTURE: CPT

## 2023-09-12 PROCEDURE — 83036 HEMOGLOBIN GLYCOSYLATED A1C: CPT

## 2024-01-22 ENCOUNTER — HOSPITAL ENCOUNTER (OUTPATIENT)
Dept: RADIOLOGY | Facility: HOSPITAL | Age: 54
Discharge: HOME | End: 2024-01-22
Attending: FAMILY MEDICINE
Payer: MEDICAID

## 2024-01-22 DIAGNOSIS — J44.9 COPD (CHRONIC OBSTRUCTIVE PULMONARY DISEASE): ICD-10-CM

## 2024-01-22 PROCEDURE — 71046 X-RAY EXAM CHEST 2 VIEWS: CPT | Mod: TC

## 2024-06-05 ENCOUNTER — HOSPITAL ENCOUNTER (EMERGENCY)
Facility: HOSPITAL | Age: 54
Discharge: HOME | End: 2024-06-06
Attending: INTERNAL MEDICINE
Payer: MEDICAID

## 2024-06-05 DIAGNOSIS — E87.20 LACTIC ACID ACIDOSIS: ICD-10-CM

## 2024-06-05 DIAGNOSIS — Z91.119 DIETARY NONCOMPLIANCE: ICD-10-CM

## 2024-06-05 DIAGNOSIS — E11.65 UNCONTROLLED TYPE 2 DIABETES MELLITUS WITH HYPERGLYCEMIA: Primary | ICD-10-CM

## 2024-06-05 DIAGNOSIS — N17.9 AKI (ACUTE KIDNEY INJURY): ICD-10-CM

## 2024-06-05 DIAGNOSIS — E86.0 DEHYDRATION: ICD-10-CM

## 2024-06-05 LAB
ALBUMIN SERPL-MCNC: 3.6 G/DL (ref 3.5–5)
ALBUMIN/GLOB SERPL: 0.9 RATIO (ref 1.1–2)
ALP SERPL-CCNC: 86 UNIT/L (ref 40–150)
ALT SERPL-CCNC: 22 UNIT/L (ref 0–55)
AMYLASE SERPL-CCNC: 52 UNIT/L (ref 25–125)
ANION GAP SERPL CALC-SCNC: 11 MEQ/L
AST SERPL-CCNC: 12 UNIT/L (ref 5–34)
BASOPHILS # BLD AUTO: 0.1 X10(3)/MCL
BASOPHILS NFR BLD AUTO: 1 %
BILIRUB SERPL-MCNC: 0.2 MG/DL
BILIRUB UR QL STRIP.AUTO: NEGATIVE
BUN SERPL-MCNC: 23 MG/DL (ref 8.4–25.7)
CALCIUM SERPL-MCNC: 9.9 MG/DL (ref 8.4–10.2)
CHLORIDE SERPL-SCNC: 95 MMOL/L (ref 98–107)
CLARITY UR: CLEAR
CO2 SERPL-SCNC: 27 MMOL/L (ref 22–29)
COLOR UR AUTO: YELLOW
CREAT SERPL-MCNC: 1.45 MG/DL (ref 0.73–1.18)
CREAT/UREA NIT SERPL: 16
EOSINOPHIL # BLD AUTO: 0.2 X10(3)/MCL (ref 0–0.9)
EOSINOPHIL NFR BLD AUTO: 1.9 %
ERYTHROCYTE [DISTWIDTH] IN BLOOD BY AUTOMATED COUNT: 12.3 % (ref 11.5–17)
ETHANOL SERPL-MCNC: <10 MG/DL
GFR SERPLBLD CREATININE-BSD FMLA CKD-EPI: 58 ML/MIN/1.73/M2
GLOBULIN SER-MCNC: 3.8 GM/DL (ref 2.4–3.5)
GLUCOSE SERPL-MCNC: 722 MG/DL (ref 74–100)
GLUCOSE UR QL STRIP: ABNORMAL
HCT VFR BLD AUTO: 48 % (ref 42–52)
HGB BLD-MCNC: 16.1 G/DL (ref 14–18)
HGB UR QL STRIP: NEGATIVE
IMM GRANULOCYTES # BLD AUTO: 0.07 X10(3)/MCL (ref 0–0.04)
IMM GRANULOCYTES NFR BLD AUTO: 0.7 %
KETONES UR QL STRIP: NEGATIVE
LACTATE SERPL-SCNC: 3.4 MMOL/L (ref 0.5–2.2)
LEUKOCYTE ESTERASE UR QL STRIP: NEGATIVE
LIPASE SERPL-CCNC: 67 U/L
LYMPHOCYTES # BLD AUTO: 3.74 X10(3)/MCL (ref 0.6–4.6)
LYMPHOCYTES NFR BLD AUTO: 36.5 %
MCH RBC QN AUTO: 31 PG (ref 27–31)
MCHC RBC AUTO-ENTMCNC: 33.5 G/DL (ref 33–36)
MCV RBC AUTO: 92.5 FL (ref 80–94)
MONOCYTES # BLD AUTO: 0.75 X10(3)/MCL (ref 0.1–1.3)
MONOCYTES NFR BLD AUTO: 7.3 %
NEUTROPHILS # BLD AUTO: 5.4 X10(3)/MCL (ref 2.1–9.2)
NEUTROPHILS NFR BLD AUTO: 52.6 %
NITRITE UR QL STRIP: NEGATIVE
PH UR STRIP: 5.5 [PH]
PLATELET # BLD AUTO: 188 X10(3)/MCL (ref 130–400)
PMV BLD AUTO: 12.7 FL (ref 7.4–10.4)
POCT GLUCOSE: >500 MG/DL (ref 70–110)
POTASSIUM SERPL-SCNC: 4.4 MMOL/L (ref 3.5–5.1)
PROT SERPL-MCNC: 7.4 GM/DL (ref 6.4–8.3)
PROT UR QL STRIP: NEGATIVE
RBC # BLD AUTO: 5.19 X10(6)/MCL (ref 4.7–6.1)
SODIUM SERPL-SCNC: 133 MMOL/L (ref 136–145)
SP GR UR STRIP.AUTO: <=1.005 (ref 1–1.03)
UROBILINOGEN UR STRIP-ACNC: 0.2
WBC # SPEC AUTO: 10.26 X10(3)/MCL (ref 4.5–11.5)

## 2024-06-05 PROCEDURE — 96361 HYDRATE IV INFUSION ADD-ON: CPT

## 2024-06-05 PROCEDURE — 83690 ASSAY OF LIPASE: CPT | Performed by: INTERNAL MEDICINE

## 2024-06-05 PROCEDURE — 99291 CRITICAL CARE FIRST HOUR: CPT | Mod: 25

## 2024-06-05 PROCEDURE — 81003 URINALYSIS AUTO W/O SCOPE: CPT | Mod: 59 | Performed by: INTERNAL MEDICINE

## 2024-06-05 PROCEDURE — 63600175 PHARM REV CODE 636 W HCPCS: Performed by: INTERNAL MEDICINE

## 2024-06-05 PROCEDURE — 82150 ASSAY OF AMYLASE: CPT | Performed by: INTERNAL MEDICINE

## 2024-06-05 PROCEDURE — 80053 COMPREHEN METABOLIC PANEL: CPT | Performed by: INTERNAL MEDICINE

## 2024-06-05 PROCEDURE — 82077 ASSAY SPEC XCP UR&BREATH IA: CPT | Performed by: INTERNAL MEDICINE

## 2024-06-05 PROCEDURE — 96374 THER/PROPH/DIAG INJ IV PUSH: CPT

## 2024-06-05 PROCEDURE — 25000003 PHARM REV CODE 250: Performed by: INTERNAL MEDICINE

## 2024-06-05 PROCEDURE — 85025 COMPLETE CBC W/AUTO DIFF WBC: CPT | Performed by: INTERNAL MEDICINE

## 2024-06-05 PROCEDURE — 83605 ASSAY OF LACTIC ACID: CPT | Performed by: INTERNAL MEDICINE

## 2024-06-05 RX ADMIN — SODIUM CHLORIDE, POTASSIUM CHLORIDE, SODIUM LACTATE AND CALCIUM CHLORIDE 1000 ML: 600; 310; 30; 20 INJECTION, SOLUTION INTRAVENOUS at 11:06

## 2024-06-05 RX ADMIN — SODIUM CHLORIDE 1000 ML: 9 INJECTION, SOLUTION INTRAVENOUS at 11:06

## 2024-06-06 VITALS
HEART RATE: 86 BPM | BODY MASS INDEX: 27.74 KG/M2 | SYSTOLIC BLOOD PRESSURE: 119 MMHG | HEIGHT: 68 IN | RESPIRATION RATE: 19 BRPM | WEIGHT: 183 LBS | TEMPERATURE: 98 F | DIASTOLIC BLOOD PRESSURE: 65 MMHG | OXYGEN SATURATION: 98 %

## 2024-06-06 LAB
LACTATE SERPL-SCNC: 2 MMOL/L (ref 0.5–2.2)
POCT GLUCOSE: 235 MG/DL (ref 70–110)
POCT GLUCOSE: >500 MG/DL (ref 70–110)

## 2024-06-06 PROCEDURE — 83605 ASSAY OF LACTIC ACID: CPT | Performed by: INTERNAL MEDICINE

## 2024-06-06 PROCEDURE — 96361 HYDRATE IV INFUSION ADD-ON: CPT

## 2024-06-06 PROCEDURE — 25000003 PHARM REV CODE 250: Performed by: INTERNAL MEDICINE

## 2024-06-06 PROCEDURE — 63600175 PHARM REV CODE 636 W HCPCS: Performed by: INTERNAL MEDICINE

## 2024-06-06 RX ADMIN — HUMAN INSULIN 5 UNITS: 100 INJECTION, SOLUTION SUBCUTANEOUS at 12:06

## 2024-06-06 RX ADMIN — SODIUM CHLORIDE 2490 ML: 9 INJECTION, SOLUTION INTRAVENOUS at 12:06

## 2024-06-06 NOTE — ED PROVIDER NOTES
"Encounter Date: 6/5/2024       History     Chief Complaint   Patient presents with    Hyperglycemia     States started feeling "shaky" and cbg >600 at home. Missed lantus last night and drinking excessive sodas today. AAOx4. Took lantus 25 units pta.     4-year-old white male presents emergency department stating that he was not feeling well in his home blood glucose reading was reading HI.  In further discussion with the he is got very poorly controlled diabetes with hemoglobin A1cs that typically run greater than 10.  He admits that he drinks sodas regularly and root beer and does not check his sugars.      Review of patient's allergies indicates:  No Known Allergies  Past Medical History:   Diagnosis Date    Anxiety 07/26/2018    Chronic obstructive lung disease 07/26/2018    Depressive disorder 07/26/2018    Diabetes mellitus     Diverticular disease 07/26/2018    DVT (deep venous thrombosis)     Essential hypertension 09/26/2019    Gastroesophageal reflux disease 07/26/2018    Hypercholesterolemia 10/04/2019    Hypothyroidism 07/26/2018    Rheumatoid arthritis, unspecified     Smoker 05/14/2021     History reviewed. No pertinent surgical history.  Family History   Problem Relation Name Age of Onset    Arthritis Mother      Cancer Father       Social History     Tobacco Use    Smoking status: Every Day     Current packs/day: 1.00     Types: Cigarettes    Smokeless tobacco: Never   Substance Use Topics    Alcohol use: Never    Drug use: Never     Review of Systems   Constitutional: Negative.  Negative for activity change, appetite change, chills, diaphoresis, fatigue, fever and unexpected weight change.   HENT: Negative.  Negative for congestion, dental problem, drooling, ear discharge, ear pain, facial swelling, hearing loss, mouth sores, nosebleeds, postnasal drip, rhinorrhea, sinus pressure, sinus pain, sneezing, sore throat, tinnitus, trouble swallowing and voice change.    Eyes: Negative.  Negative for " photophobia, pain, discharge, redness, itching and visual disturbance.   Respiratory: Negative.  Negative for apnea, cough, choking, chest tightness, shortness of breath, wheezing and stridor.    Cardiovascular: Negative.  Negative for chest pain, palpitations and leg swelling.   Gastrointestinal: Negative.  Negative for abdominal distention, abdominal pain, anal bleeding, blood in stool, constipation, diarrhea, nausea, rectal pain and vomiting.   Endocrine: Positive for polydipsia, polyphagia and polyuria. Negative for cold intolerance and heat intolerance.   Genitourinary: Negative.  Negative for decreased urine volume, difficulty urinating, dysuria, enuresis, flank pain, frequency, genital sores, hematuria, penile discharge, penile pain, penile swelling, scrotal swelling, testicular pain and urgency.   Musculoskeletal: Negative.  Negative for arthralgias, back pain, gait problem, joint swelling, myalgias, neck pain and neck stiffness.   Skin: Negative.  Negative for color change, pallor, rash and wound.   Allergic/Immunologic: Negative.  Negative for environmental allergies, food allergies and immunocompromised state.   Neurological:  Positive for light-headedness. Negative for dizziness, tremors, seizures, syncope, facial asymmetry, speech difficulty, weakness, numbness and headaches.   Hematological: Negative.  Negative for adenopathy. Does not bruise/bleed easily.   Psychiatric/Behavioral: Negative.  Negative for agitation, behavioral problems, confusion, decreased concentration, dysphoric mood, hallucinations, self-injury, sleep disturbance and suicidal ideas. The patient is not nervous/anxious and is not hyperactive.    All other systems reviewed and are negative.      Physical Exam     Initial Vitals [06/05/24 2258]   BP Pulse Resp Temp SpO2   (!) 177/78 89 18 97.9 °F (36.6 °C) 98 %      MAP       --         Physical Exam    Nursing note and vitals reviewed.  Constitutional: He appears well-developed and  well-nourished.   HENT:   Head: Normocephalic and atraumatic.   Dry mucous membranes, poor dentition   Eyes: Conjunctivae and EOM are normal. Pupils are equal, round, and reactive to light.   Neck: Neck supple.   Normal range of motion.  Cardiovascular:  Normal rate and regular rhythm.           Pulmonary/Chest: Breath sounds normal.   Abdominal: Abdomen is soft. Bowel sounds are normal.   Musculoskeletal:         General: Normal range of motion.      Cervical back: Normal range of motion and neck supple.     Neurological: He is alert and oriented to person, place, and time.   Skin: Skin is warm and dry. Capillary refill takes less than 2 seconds.   Poor skin turgor   Psychiatric: He has a normal mood and affect. His behavior is normal. Judgment and thought content normal.         ED Course   Critical Care    Date/Time: 6/6/2024 2:27 AM    Performed by: Ry Last MD  Authorized by: Ry Last MD  Direct patient critical care time: 24 minutes  Additional history critical care time: 13 minutes  Ordering / reviewing critical care time: 12 minutes  Documentation critical care time: 14 minutes  Total critical care time (exclusive of procedural time) : 63 minutes  Critical care time was exclusive of separately billable procedures and treating other patients.  Critical care was necessary to treat or prevent imminent or life-threatening deterioration of the following conditions: dehydration, metabolic crisis and endocrine crisis.  Critical care was time spent personally by me on the following activities: blood draw for specimens, development of treatment plan with patient or surrogate, interpretation of cardiac output measurements, evaluation of patient's response to treatment, examination of patient, obtaining history from patient or surrogate, ordering and performing treatments and interventions, ordering and review of laboratory studies, pulse oximetry, re-evaluation of patient's condition, review  of old charts and transcutaneous pacing.        Admission on 06/05/2024   Component Date Value Ref Range Status    POCT Glucose 06/05/2024 >500 (HH)  70 - 110 mg/dL Final    Sodium 06/05/2024 133 (L)  136 - 145 mmol/L Final    Potassium 06/05/2024 4.4  3.5 - 5.1 mmol/L Final    Chloride 06/05/2024 95 (L)  98 - 107 mmol/L Final    CO2 06/05/2024 27  22 - 29 mmol/L Final    Glucose 06/05/2024 722 (HH)  74 - 100 mg/dL Final    Blood Urea Nitrogen 06/05/2024 23.0  8.4 - 25.7 mg/dL Final    Creatinine 06/05/2024 1.45 (H)  0.73 - 1.18 mg/dL Final    Calcium 06/05/2024 9.9  8.4 - 10.2 mg/dL Final    Protein Total 06/05/2024 7.4  6.4 - 8.3 gm/dL Final    Albumin 06/05/2024 3.6  3.5 - 5.0 g/dL Final    Globulin 06/05/2024 3.8 (H)  2.4 - 3.5 gm/dL Final    Albumin/Globulin Ratio 06/05/2024 0.9 (L)  1.1 - 2.0 ratio Final    Bilirubin Total 06/05/2024 0.2  <=1.5 mg/dL Final    ALP 06/05/2024 86  40 - 150 unit/L Final    ALT 06/05/2024 22  0 - 55 unit/L Final    AST 06/05/2024 12  5 - 34 unit/L Final    eGFR 06/05/2024 58  mL/min/1.73/m2 Final    Anion Gap 06/05/2024 11.0  mEq/L Final    BUN/Creatinine Ratio 06/05/2024 16   Final    Color, UA 06/05/2024 Yellow  Yellow, Light-Yellow, Dark Yellow, Christina, Straw Final    Appearance, UA 06/05/2024 Clear  Clear Final    Specific Gravity, UA 06/05/2024 <=1.005  1.005 - 1.030 Final    pH, UA 06/05/2024 5.5  5.0 - 8.5 Final    Protein, UA 06/05/2024 Negative  Negative Final    Glucose, UA 06/05/2024 3+ (A)  Negative, Normal Final    Ketones, UA 06/05/2024 Negative  Negative Final    Blood, UA 06/05/2024 Negative  Negative Final    Bilirubin, UA 06/05/2024 Negative  Negative Final    Urobilinogen, UA 06/05/2024 0.2  0.2, 1.0, Normal Final    Nitrites, UA 06/05/2024 Negative  Negative Final    Leukocyte Esterase, UA 06/05/2024 Negative  Negative Final    Ethanol Level 06/05/2024 <10.0  <=10.0 mg/dL Final    Lactic Acid Level 06/05/2024 3.4 (H)  0.5 - 2.2 mmol/L Final    Amylase Level  06/05/2024 52  25 - 125 unit/L Final    Lipase Level 06/05/2024 67 (H)  <=60 U/L Final    WBC 06/05/2024 10.26  4.50 - 11.50 x10(3)/mcL Final    RBC 06/05/2024 5.19  4.70 - 6.10 x10(6)/mcL Final    Hgb 06/05/2024 16.1  14.0 - 18.0 g/dL Final    Hct 06/05/2024 48.0  42.0 - 52.0 % Final    MCV 06/05/2024 92.5  80.0 - 94.0 fL Final    MCH 06/05/2024 31.0  27.0 - 31.0 pg Final    MCHC 06/05/2024 33.5  33.0 - 36.0 g/dL Final    RDW 06/05/2024 12.3  11.5 - 17.0 % Final    Platelet 06/05/2024 188  130 - 400 x10(3)/mcL Final    MPV 06/05/2024 12.7 (H)  7.4 - 10.4 fL Final    Neut % 06/05/2024 52.6  % Final    Lymph % 06/05/2024 36.5  % Final    Mono % 06/05/2024 7.3  % Final    Eos % 06/05/2024 1.9  % Final    Basophil % 06/05/2024 1.0  % Final    Lymph # 06/05/2024 3.74  0.6 - 4.6 x10(3)/mcL Final    Neut # 06/05/2024 5.40  2.1 - 9.2 x10(3)/mcL Final    Mono # 06/05/2024 0.75  0.1 - 1.3 x10(3)/mcL Final    Eos # 06/05/2024 0.20  0 - 0.9 x10(3)/mcL Final    Baso # 06/05/2024 0.10  <=0.2 x10(3)/mcL Final    IG# 06/05/2024 0.07 (H)  0 - 0.04 x10(3)/mcL Final    IG% 06/05/2024 0.7  % Final    POCT Glucose 06/06/2024 >500 (HH)  70 - 110 mg/dL Final    Lactic Acid Level 06/06/2024 2.0  0.5 - 2.2 mmol/L Final    POCT Glucose 06/06/2024 235 (H)  70 - 110 mg/dL Final       Labs Reviewed   COMPREHENSIVE METABOLIC PANEL - Abnormal; Notable for the following components:       Result Value    Sodium 133 (*)     Chloride 95 (*)     Glucose 722 (*)     Creatinine 1.45 (*)     Globulin 3.8 (*)     Albumin/Globulin Ratio 0.9 (*)     All other components within normal limits   URINALYSIS, REFLEX TO URINE CULTURE - Abnormal; Notable for the following components:    Glucose, UA 3+ (*)     All other components within normal limits   LACTIC ACID, PLASMA - Abnormal; Notable for the following components:    Lactic Acid Level 3.4 (*)     All other components within normal limits   LIPASE - Abnormal; Notable for the following components:    Lipase  Level 67 (*)     All other components within normal limits   CBC WITH DIFFERENTIAL - Abnormal; Notable for the following components:    MPV 12.7 (*)     IG# 0.07 (*)     All other components within normal limits   POCT GLUCOSE - Abnormal; Notable for the following components:    POCT Glucose >500 (*)     All other components within normal limits   POCT GLUCOSE - Abnormal; Notable for the following components:    POCT Glucose >500 (*)     All other components within normal limits   POCT GLUCOSE - Abnormal; Notable for the following components:    POCT Glucose 235 (*)     All other components within normal limits   ALCOHOL,MEDICAL (ETHANOL) - Normal   AMYLASE - Normal   LACTIC ACID, PLASMA - Normal   CBC W/ AUTO DIFFERENTIAL    Narrative:     The following orders were created for panel order CBC auto differential.  Procedure                               Abnormality         Status                     ---------                               -----------         ------                     CBC with Differential[[id number]]       Abnormal            Final result                 Please view results for these tests on the individual orders.   POCT GLUCOSE, HAND-HELD DEVICE          Imaging Results    None          Medications   sodium chloride 0.9% bolus 1,000 mL 1,000 mL (0 mLs Intravenous Stopped 6/6/24 0019)   lactated ringers bolus 1,000 mL (0 mLs Intravenous Stopped 6/6/24 0019)   sodium chloride 0.9% bolus 2,490 mL 2,490 mL (0 mLs Intravenous Stopped 6/6/24 0120)   insulin regular injection 5 Units 0.05 mL (5 Units Intravenous Given 6/6/24 0014)     Medical Decision Making  54-year-old white male with elevated blood sugar.  Differential diagnosis included diabetic ketoacidosis, hyperosmolar nonketotic, anion gap acidosis, hyperglycemia, uncontrolled diabetes, dietary and medication noncompliance, dehydration, sepsis, urinary tract infection, pancreatitis.  Workup included blood work which showed a blood sugar of  greater than 700 however his anion gap was less than 12.  He did have a elevation in his lactic acid consistent with a lactic acid acidosis so he was given several L IV fluid boluses including a 30 mL per kg body weight normal saline bolus.  At that point he was given 5 units intravenously of regular insulin repeat blood sugar 1 hour later after the fluid boluses and insulin was  230s.  Repeat lactic acid was then drawn in his showing his back to normal limits so therefore he will be discharged home and educated on proper diet as he drinks sugar drinks constantly and I explained to him that he is obviously powering through his medications    Problems Addressed:  FOUZIA (acute kidney injury): acute illness or injury  Dehydration: acute illness or injury with systemic symptoms that poses a threat to life or bodily functions  Lactic acid acidosis: acute illness or injury with systemic symptoms that poses a threat to life or bodily functions     Details: Repeat lactic acid after several L of fluid is showing that his lactic acid is 2.0.  Reperfusion exam shows that his capillary refills less than 2 seconds in his skin turgor as returned to normal patient feels markedly improved  Uncontrolled type 2 diabetes mellitus with hyperglycemia: chronic illness or injury    Amount and/or Complexity of Data Reviewed  Independent Historian: spouse  External Data Reviewed: labs and notes.  Labs: ordered. Decision-making details documented in ED Course.    Risk  OTC drugs.  Diagnosis or treatment significantly limited by social determinants of health.               ED Course as of 06/06/24 0229 Thu Jun 06, 2024 0227 Glucose(!!): 722 [PL]   0227 POCT Glucose(!): 235 [PL]      ED Course User Index  [PL] Ry Last MD                           Clinical Impression:  Final diagnoses:  [E11.65] Uncontrolled type 2 diabetes mellitus with hyperglycemia (Primary)  [E86.0] Dehydration  [N17.9] FOUZIA (acute kidney injury)  [E87.20]  Lactic acid acidosis  [Z91.119] Dietary noncompliance          ED Disposition Condition    Discharge Stable          ED Prescriptions    None       Follow-up Information       Follow up With Specialties Details Why Contact Info    Almas Crockett MD Family Medicine In 1 week  [address] [place] [address]  [phone]               Ry Last MD  06/06/24 7752

## 2024-08-28 ENCOUNTER — HOSPITAL ENCOUNTER (EMERGENCY)
Facility: HOSPITAL | Age: 54
Discharge: HOME | End: 2024-08-28
Attending: INTERNAL MEDICINE
Payer: MEDICAID

## 2024-08-28 VITALS
DIASTOLIC BLOOD PRESSURE: 78 MMHG | HEIGHT: 68 IN | TEMPERATURE: 98 F | SYSTOLIC BLOOD PRESSURE: 144 MMHG | BODY MASS INDEX: 27.4 KG/M2 | RESPIRATION RATE: 16 BRPM | OXYGEN SATURATION: 99 % | HEART RATE: 71 BPM | WEIGHT: 180.81 LBS

## 2024-08-28 DIAGNOSIS — H57.8A2 FOREIGN BODY SENSATION, LEFT EYE: Primary | ICD-10-CM

## 2024-08-28 PROCEDURE — 25000003 PHARM REV CODE 250

## 2024-08-28 PROCEDURE — 99283 EMERGENCY DEPT VISIT LOW MDM: CPT

## 2024-08-28 PROCEDURE — 25000003 PHARM REV CODE 250: Performed by: INTERNAL MEDICINE

## 2024-08-28 RX ORDER — ERYTHROMYCIN 5 MG/G
OINTMENT OPHTHALMIC
Qty: 3.5 G | Refills: 0 | Status: SHIPPED | OUTPATIENT
Start: 2024-08-28

## 2024-08-28 RX ORDER — TETRACAINE HYDROCHLORIDE 5 MG/ML
2 SOLUTION OPHTHALMIC
Status: COMPLETED | OUTPATIENT
Start: 2024-08-28 | End: 2024-08-28

## 2024-08-28 RX ORDER — ERYTHROMYCIN 5 MG/G
OINTMENT OPHTHALMIC
Qty: 3.5 G | Refills: 0 | Status: SHIPPED | OUTPATIENT
Start: 2024-08-28 | End: 2024-08-28

## 2024-08-28 RX ORDER — ERYTHROMYCIN 5 MG/G
OINTMENT OPHTHALMIC
Status: COMPLETED | OUTPATIENT
Start: 2024-08-28 | End: 2024-08-28

## 2024-08-28 RX ADMIN — TETRACAINE HYDROCHLORIDE 2 DROP: 5 SOLUTION OPHTHALMIC at 10:08

## 2024-08-28 RX ADMIN — ERYTHROMYCIN: 5 OINTMENT OPHTHALMIC at 11:08

## 2024-08-28 RX ADMIN — FLUORESCEIN SODIUM 1 EACH: 1 STRIP OPHTHALMIC at 10:08

## 2024-08-29 NOTE — ED PROVIDER NOTES
"Encounter Date: 8/28/2024  History from patient     History     Chief Complaint   Patient presents with    Eye Pain     Patient c/o left eye pain. Patient state "weed eating earlier today" and felt something go in eye. No obvious signs of trauma to eye. Some redness to eye. Denies visual acuity changes     HPI    Oz Sher Jr. is 54 y.o. male who  has a past medical history of Anxiety (07/26/2018), Chronic obstructive lung disease (07/26/2018), Depressive disorder (07/26/2018), Diabetes mellitus, Diverticular disease (07/26/2018), DVT (deep venous thrombosis), Essential hypertension (09/26/2019), Gastroesophageal reflux disease (07/26/2018), Hypercholesterolemia (10/04/2019), Hypothyroidism (07/26/2018), Rheumatoid arthritis, unspecified, and Smoker (05/14/2021). arrives in ER with c/o Eye Pain (Patient c/o left eye pain. Patient state "weed eating earlier today" and felt something go in eye. No obvious signs of trauma to eye. Some redness to eye. Denies visual acuity changes)      Review of patient's allergies indicates:  No Known Allergies  Past Medical History:   Diagnosis Date    Anxiety 07/26/2018    Chronic obstructive lung disease 07/26/2018    Depressive disorder 07/26/2018    Diabetes mellitus     Diverticular disease 07/26/2018    DVT (deep venous thrombosis)     Essential hypertension 09/26/2019    Gastroesophageal reflux disease 07/26/2018    Hypercholesterolemia 10/04/2019    Hypothyroidism 07/26/2018    Rheumatoid arthritis, unspecified     Smoker 05/14/2021     History reviewed. No pertinent surgical history.  Family History   Problem Relation Name Age of Onset    Arthritis Mother      Cancer Father       Social History     Tobacco Use    Smoking status: Every Day     Current packs/day: 1.00     Types: Cigarettes    Smokeless tobacco: Never   Substance Use Topics    Alcohol use: Never    Drug use: Never     Review of Systems   Constitutional:  Negative for fever.   HENT:  Negative for trouble " swallowing and voice change.    Eyes:  Positive for pain, discharge, redness and visual disturbance.   Respiratory:  Negative for cough and shortness of breath.    Cardiovascular:  Negative for chest pain.   Gastrointestinal:  Negative for abdominal pain, diarrhea and vomiting.   Genitourinary:  Negative for dysuria and hematuria.   Musculoskeletal:  Negative for back pain and gait problem.   Skin:  Negative for color change and rash.   Neurological:  Negative for headaches.   Psychiatric/Behavioral:  Negative for behavioral problems and sleep disturbance.    All other systems reviewed and are negative.      Physical Exam     Initial Vitals [08/28/24 2208]   BP Pulse Resp Temp SpO2   (!) 157/82 76 16 98.4 °F (36.9 °C) 98 %      MAP       --         Physical Exam    Nursing note and vitals reviewed.  Constitutional: He appears well-developed.   Eyes: EOM are normal.   Eye:    External: Symmetric Bilaterally except for slight edema of the left upper lid  Periorbital: No Swelling, No Ecchymosis, No Bruising, No Edema  Lids: Normal, minimal swelling of left upper lid, No Stye, No Entropion or Ectropion, Eye Lashes Normal. Everted for Exam  Sclera: No Subconjunctival Hemorrhage, No edema, Normal Color  Conjunctiva:  Injected on left and Clear, minimal Erythema, No chemosis, No edema,   Cornea: Clear, No Haziness, No Abrasions, No Fluorecin uptake,   Anterior Chamber: Clear, No Hyphema, no Hypopyon  Pupil: Round, Regular and Reacting to Light and Accomodation, No Asymmetry  Lens: Clear, No Cataract  Fundus Glow: Normal Bilaterally  No foreign body under left eyelid everted the eye lid.       Neck: Neck supple.   Cardiovascular:  Normal rate.           Pulmonary/Chest: Breath sounds normal.   Musculoskeletal:         General: Normal range of motion.      Cervical back: Neck supple.     Neurological: He is alert and oriented to person, place, and time. GCS score is 15. GCS eye subscore is 4. GCS verbal subscore is 5. GCS  motor subscore is 6.         ED Course   Procedures  Labs Reviewed - No data to display       Imaging Results    None          Medications   erythromycin 5 mg/gram (0.5 %) ophthalmic ointment (has no administration in time range)   TETRAcaine HCl (PF) 0.5 % Drop 2 drop (2 drops Left Eye Given 8/28/24 2230)   fluorescein ophthalmic strip 1 each (1 each Left Eye Given 8/28/24 2230)     Medical Decision Making  Patient was weed eating yesterday and something flew in his left eye, has been hurting in the left eye since then, he went to 1 of the eye doctors today but he was not there, he tried to go to another optometrist and they do not come there on a daily basis either so he decided to come to the emergency room at this time.  He has been going on since yesterday and he continues to feel something under the eyelid mainly upper left eye.    On examination I do not find any foreign body under the eyelid, I everted the eyelid, I gave tetracaine and fluorescein in the left eye, there is no fluorescein uptake of the cornea,    His vision was 20/400 in the left eye, 20/70 in the right eye, 20/70 bilateral eyes    I advised the patient that the I will apply antibiotic ointment to the left eye to provide some grease in there and to help with the foreign body sensation, but he must go and see the eye doctor for re-evaluation and further treatment if needed.  I will let him go home.    Problems Addressed:  Foreign body sensation, left eye:     Details: No foreign body identified in the eye, no corneal abrasion identified, no other trauma to the eyeball identified.  Advised to see an eye doctor for re-evaluation.    Risk  Prescription drug management.                                      Clinical Impression:  Final diagnoses:  [H57.8A2] Foreign body sensation, left eye (Primary)          ED Disposition Condition    Discharge Stable          ED Prescriptions       Medication Sig Dispense Start Date End Date Auth. Provider     erythromycin (ROMYCIN) ophthalmic ointment  (Status: Discontinued) Place a 1/2 inch ribbon of ointment into the lower eyelid. 3.5 g 8/28/2024 8/28/2024 Raquel Macedo MD    erythromycin (ROMYCIN) ophthalmic ointment Place a 1/2 inch ribbon of ointment into the lower eyelid. 3.5 g 8/28/2024 -- Raquel Macedo MD          Follow-up Information       Follow up With Specialties Details Why Contact Info    Almas Crockett MD Family Medicine In 2 days  [address]  [place] [address]  [phone]      Eye doctor                 Raquel Macedo MD  08/28/24 8441

## 2024-08-29 NOTE — DISCHARGE INSTRUCTIONS
Must see an eye doctor as soon as possible for reevaluation of the eyes in detail    Use medicine as prescribed until seen by the eye doctor          Take medicines as prescribed    See your family doctor in one to 2 days for further evaluation, workup, and treatment as necessary    Avoid driving or operating machinery while taking medicines as some medicines might cause drowsiness and may cause problems. Also pain medicines have potential of being addictive  so use Pain meds specially Narcotics Sparingly.    The exam and treatment you received in Emergency Room was for an urgent problem and NOT INTENDED AS COMPLETE CARE. It is important that you FOLLOW UP with a doctor for ongoing care. If your symptoms become WORSE or you DO NOT IMPROVE and you are unable to reach your health care provider, you should RETURN to the emergency department. The Emergency Room doctor has provided a PRELIMINARY INTERPRETATION of all your STUDIES. A final interpretation may be done after you are discharged. IF A CHANGE in your diagnosis or treatment is needed WE WILL CONTACT YOU. It is critical that we have a CURRENT PHONE NUMBER FOR YOU.

## 2024-10-28 ENCOUNTER — LAB VISIT (OUTPATIENT)
Dept: LAB | Facility: HOSPITAL | Age: 54
End: 2024-10-28
Attending: PEDIATRICS
Payer: MEDICAID

## 2024-10-28 DIAGNOSIS — E78.00 HIGH CHOLESTEROL: Primary | ICD-10-CM

## 2024-10-28 DIAGNOSIS — E55.9 VITAMIN D DEFICIENCY: ICD-10-CM

## 2024-10-28 LAB
25(OH)D3+25(OH)D2 SERPL-MCNC: 96 NG/ML (ref 30–80)
ALBUMIN SERPL-MCNC: 3.5 G/DL (ref 3.5–5)
ALBUMIN/GLOB SERPL: 1 RATIO (ref 1.1–2)
ALP SERPL-CCNC: 77 UNIT/L (ref 40–150)
ALT SERPL-CCNC: 18 UNIT/L (ref 0–55)
ANION GAP SERPL CALC-SCNC: 8 MEQ/L
AST SERPL-CCNC: 14 UNIT/L (ref 5–34)
BACTERIA #/AREA URNS AUTO: ABNORMAL /HPF
BASOPHILS # BLD AUTO: 0.1 X10(3)/MCL
BASOPHILS NFR BLD AUTO: 0.9 %
BILIRUB SERPL-MCNC: 0.4 MG/DL
BILIRUB UR QL STRIP.AUTO: NEGATIVE
BUN SERPL-MCNC: 20 MG/DL (ref 8.4–25.7)
CALCIUM SERPL-MCNC: 9.3 MG/DL (ref 8.4–10.2)
CHLORIDE SERPL-SCNC: 108 MMOL/L (ref 98–107)
CHOLEST SERPL-MCNC: 216 MG/DL
CHOLEST/HDLC SERPL: 6 {RATIO} (ref 0–5)
CLARITY UR: CLEAR
CO2 SERPL-SCNC: 21 MMOL/L (ref 22–29)
COLOR UR AUTO: YELLOW
CREAT SERPL-MCNC: 1.18 MG/DL (ref 0.72–1.25)
CREAT/UREA NIT SERPL: 17
EOSINOPHIL # BLD AUTO: 0.17 X10(3)/MCL (ref 0–0.9)
EOSINOPHIL NFR BLD AUTO: 1.6 %
ERYTHROCYTE [DISTWIDTH] IN BLOOD BY AUTOMATED COUNT: 11.6 % (ref 11.5–17)
EST. AVERAGE GLUCOSE BLD GHB EST-MCNC: 297.7 MG/DL
GFR SERPLBLD CREATININE-BSD FMLA CKD-EPI: >60 ML/MIN/1.73/M2
GLOBULIN SER-MCNC: 3.5 GM/DL (ref 2.4–3.5)
GLUCOSE SERPL-MCNC: 331 MG/DL (ref 74–100)
GLUCOSE UR QL STRIP: ABNORMAL
HBA1C MFR BLD: 12 %
HCT VFR BLD AUTO: 46.7 % (ref 42–52)
HDLC SERPL-MCNC: 35 MG/DL (ref 35–60)
HGB BLD-MCNC: 15.8 G/DL (ref 14–18)
HGB UR QL STRIP: ABNORMAL
IMM GRANULOCYTES # BLD AUTO: 0.06 X10(3)/MCL (ref 0–0.04)
IMM GRANULOCYTES NFR BLD AUTO: 0.6 %
KETONES UR QL STRIP: NEGATIVE
LDLC SERPL CALC-MCNC: 129 MG/DL (ref 50–140)
LEUKOCYTE ESTERASE UR QL STRIP: NEGATIVE
LYMPHOCYTES # BLD AUTO: 3.05 X10(3)/MCL (ref 0.6–4.6)
LYMPHOCYTES NFR BLD AUTO: 28.7 %
MCH RBC QN AUTO: 31.3 PG (ref 27–31)
MCHC RBC AUTO-ENTMCNC: 33.8 G/DL (ref 33–36)
MCV RBC AUTO: 92.7 FL (ref 80–94)
MONOCYTES # BLD AUTO: 0.62 X10(3)/MCL (ref 0.1–1.3)
MONOCYTES NFR BLD AUTO: 5.8 %
MUCOUS THREADS URNS QL MICRO: ABNORMAL /LPF
NEUTROPHILS # BLD AUTO: 6.63 X10(3)/MCL (ref 2.1–9.2)
NEUTROPHILS NFR BLD AUTO: 62.4 %
NITRITE UR QL STRIP: NEGATIVE
PH UR STRIP: 5 [PH]
PLATELET # BLD AUTO: 143 X10(3)/MCL (ref 130–400)
PMV BLD AUTO: 12.5 FL (ref 7.4–10.4)
POTASSIUM SERPL-SCNC: 5.4 MMOL/L (ref 3.5–5.1)
PROT SERPL-MCNC: 7 GM/DL (ref 6.4–8.3)
PROT UR QL STRIP: NEGATIVE
RBC # BLD AUTO: 5.04 X10(6)/MCL (ref 4.7–6.1)
RBC #/AREA URNS AUTO: ABNORMAL /HPF
SODIUM SERPL-SCNC: 137 MMOL/L (ref 136–145)
SP GR UR STRIP.AUTO: 1.01 (ref 1–1.03)
SQUAMOUS #/AREA URNS AUTO: ABNORMAL /HPF
T4 FREE SERPL-MCNC: 1.06 NG/DL (ref 0.7–1.48)
TRIGL SERPL-MCNC: 262 MG/DL (ref 34–140)
TSH SERPL-ACNC: 2.11 UIU/ML (ref 0.35–4.94)
UROBILINOGEN UR STRIP-ACNC: 0.2
VLDLC SERPL CALC-MCNC: 52 MG/DL
WBC # BLD AUTO: 10.63 X10(3)/MCL (ref 4.5–11.5)
WBC #/AREA URNS AUTO: ABNORMAL /HPF

## 2024-10-28 PROCEDURE — 80053 COMPREHEN METABOLIC PANEL: CPT

## 2024-10-28 PROCEDURE — 81003 URINALYSIS AUTO W/O SCOPE: CPT

## 2024-10-28 PROCEDURE — 84443 ASSAY THYROID STIM HORMONE: CPT

## 2024-10-28 PROCEDURE — 82306 VITAMIN D 25 HYDROXY: CPT

## 2024-10-28 PROCEDURE — 83036 HEMOGLOBIN GLYCOSYLATED A1C: CPT

## 2024-10-28 PROCEDURE — 85025 COMPLETE CBC W/AUTO DIFF WBC: CPT

## 2024-10-28 PROCEDURE — 36415 COLL VENOUS BLD VENIPUNCTURE: CPT

## 2024-10-28 PROCEDURE — 84439 ASSAY OF FREE THYROXINE: CPT

## 2024-10-28 PROCEDURE — 80061 LIPID PANEL: CPT

## 2024-12-13 ENCOUNTER — HOSPITAL ENCOUNTER (EMERGENCY)
Facility: HOSPITAL | Age: 54
Discharge: HOME | End: 2024-12-13
Attending: INTERNAL MEDICINE
Payer: MEDICAID

## 2024-12-13 VITALS
RESPIRATION RATE: 18 BRPM | WEIGHT: 185 LBS | HEIGHT: 69 IN | HEART RATE: 86 BPM | OXYGEN SATURATION: 100 % | TEMPERATURE: 98 F | SYSTOLIC BLOOD PRESSURE: 128 MMHG | BODY MASS INDEX: 27.4 KG/M2 | DIASTOLIC BLOOD PRESSURE: 64 MMHG

## 2024-12-13 DIAGNOSIS — R73.9 HYPERGLYCEMIA: ICD-10-CM

## 2024-12-13 DIAGNOSIS — M79.605 PAIN IN BOTH LOWER EXTREMITIES: Primary | ICD-10-CM

## 2024-12-13 DIAGNOSIS — M79.604 PAIN IN BOTH LOWER EXTREMITIES: Primary | ICD-10-CM

## 2024-12-13 LAB
ALBUMIN SERPL-MCNC: 3.4 G/DL (ref 3.5–5)
ALBUMIN/GLOB SERPL: 1 RATIO (ref 1.1–2)
ALP SERPL-CCNC: 85 UNIT/L (ref 40–150)
ALT SERPL-CCNC: 18 UNIT/L (ref 0–55)
ANION GAP SERPL CALC-SCNC: 8 MEQ/L
AST SERPL-CCNC: 11 UNIT/L (ref 5–34)
BASOPHILS # BLD AUTO: 0.07 X10(3)/MCL
BASOPHILS NFR BLD AUTO: 0.7 %
BILIRUB SERPL-MCNC: 0.3 MG/DL
BUN SERPL-MCNC: 18 MG/DL (ref 8.4–25.7)
CALCIUM SERPL-MCNC: 9.5 MG/DL (ref 8.4–10.2)
CHLORIDE SERPL-SCNC: 101 MMOL/L (ref 98–107)
CO2 SERPL-SCNC: 28 MMOL/L (ref 22–29)
CREAT SERPL-MCNC: 1.32 MG/DL (ref 0.72–1.25)
CREAT/UREA NIT SERPL: 14
D DIMER PPP IA.FEU-MCNC: <0.27 UG/ML FEU (ref 0–0.5)
EOSINOPHIL # BLD AUTO: 0.14 X10(3)/MCL (ref 0–0.9)
EOSINOPHIL NFR BLD AUTO: 1.5 %
ERYTHROCYTE [DISTWIDTH] IN BLOOD BY AUTOMATED COUNT: 11.8 % (ref 11.5–17)
GFR SERPLBLD CREATININE-BSD FMLA CKD-EPI: >60 ML/MIN/1.73/M2
GLOBULIN SER-MCNC: 3.3 GM/DL (ref 2.4–3.5)
GLUCOSE SERPL-MCNC: 492 MG/DL (ref 74–100)
HCT VFR BLD AUTO: 44.7 % (ref 42–52)
HGB BLD-MCNC: 15.5 G/DL (ref 14–18)
IMM GRANULOCYTES # BLD AUTO: 0.04 X10(3)/MCL (ref 0–0.04)
IMM GRANULOCYTES NFR BLD AUTO: 0.4 %
LYMPHOCYTES # BLD AUTO: 3.01 X10(3)/MCL (ref 0.6–4.6)
LYMPHOCYTES NFR BLD AUTO: 32 %
MCH RBC QN AUTO: 31.6 PG (ref 27–31)
MCHC RBC AUTO-ENTMCNC: 34.7 G/DL (ref 33–36)
MCV RBC AUTO: 91 FL (ref 80–94)
MONOCYTES # BLD AUTO: 0.7 X10(3)/MCL (ref 0.1–1.3)
MONOCYTES NFR BLD AUTO: 7.4 %
NEUTROPHILS # BLD AUTO: 5.46 X10(3)/MCL (ref 2.1–9.2)
NEUTROPHILS NFR BLD AUTO: 58 %
PLATELET # BLD AUTO: 162 X10(3)/MCL (ref 130–400)
PMV BLD AUTO: 12.9 FL (ref 7.4–10.4)
POCT GLUCOSE: 226 MG/DL (ref 70–110)
POCT GLUCOSE: 349 MG/DL (ref 70–110)
POTASSIUM SERPL-SCNC: 4.6 MMOL/L (ref 3.5–5.1)
PROT SERPL-MCNC: 6.7 GM/DL (ref 6.4–8.3)
RBC # BLD AUTO: 4.91 X10(6)/MCL (ref 4.7–6.1)
SODIUM SERPL-SCNC: 137 MMOL/L (ref 136–145)
WBC # BLD AUTO: 9.42 X10(3)/MCL (ref 4.5–11.5)

## 2024-12-13 PROCEDURE — 99284 EMERGENCY DEPT VISIT MOD MDM: CPT | Mod: 25

## 2024-12-13 PROCEDURE — 80053 COMPREHEN METABOLIC PANEL: CPT | Performed by: NURSE PRACTITIONER

## 2024-12-13 PROCEDURE — 63600175 PHARM REV CODE 636 W HCPCS: Performed by: NURSE PRACTITIONER

## 2024-12-13 PROCEDURE — 85025 COMPLETE CBC W/AUTO DIFF WBC: CPT | Performed by: NURSE PRACTITIONER

## 2024-12-13 PROCEDURE — 96361 HYDRATE IV INFUSION ADD-ON: CPT

## 2024-12-13 PROCEDURE — 25000003 PHARM REV CODE 250: Performed by: NURSE PRACTITIONER

## 2024-12-13 PROCEDURE — 85379 FIBRIN DEGRADATION QUANT: CPT | Performed by: NURSE PRACTITIONER

## 2024-12-13 PROCEDURE — 82962 GLUCOSE BLOOD TEST: CPT

## 2024-12-13 PROCEDURE — 96374 THER/PROPH/DIAG INJ IV PUSH: CPT

## 2024-12-13 RX ADMIN — HUMAN INSULIN 10 UNITS: 100 INJECTION, SOLUTION SUBCUTANEOUS at 06:12

## 2024-12-13 RX ADMIN — SODIUM CHLORIDE 1000 ML: 9 INJECTION, SOLUTION INTRAVENOUS at 05:12

## 2024-12-13 NOTE — ED PROVIDER NOTES
Encounter Date: 12/13/2024       History     Chief Complaint   Patient presents with    Leg Pain    Leg Swelling     Bilat leg swelling and pain   L calf is worse   started a week ago     See MDM    The history is provided by the patient. No  was used.     Review of patient's allergies indicates:  No Known Allergies  Past Medical History:   Diagnosis Date    Anxiety 07/26/2018    Chronic obstructive lung disease 07/26/2018    Depressive disorder 07/26/2018    Diabetes mellitus     Diverticular disease 07/26/2018    DVT (deep venous thrombosis)     Essential hypertension 09/26/2019    Gastroesophageal reflux disease 07/26/2018    Hypercholesterolemia 10/04/2019    Hypothyroidism 07/26/2018    Rheumatoid arthritis, unspecified     Smoker 05/14/2021     History reviewed. No pertinent surgical history.  Family History   Problem Relation Name Age of Onset    Arthritis Mother      Cancer Father       Social History     Tobacco Use    Smoking status: Every Day     Current packs/day: 1.00     Types: Cigarettes    Smokeless tobacco: Never   Substance Use Topics    Alcohol use: Never    Drug use: Never     Review of Systems   Constitutional:  Negative for fever.   Respiratory:  Negative for cough and shortness of breath.    Cardiovascular:  Negative for chest pain.   Gastrointestinal:  Negative for abdominal pain.   Genitourinary:  Negative for difficulty urinating and dysuria.   Musculoskeletal:  Negative for gait problem.   Skin:  Negative for color change.   Neurological:  Negative for dizziness, speech difficulty and headaches.   Psychiatric/Behavioral:  Negative for hallucinations and suicidal ideas.    All other systems reviewed and are negative.      Physical Exam     Initial Vitals [12/13/24 1518]   BP Pulse Resp Temp SpO2   (!) 159/82 89 16 98 °F (36.7 °C) 97 %      MAP       --         Physical Exam    Nursing note and vitals reviewed.  Constitutional: He appears well-developed and  well-nourished.   HENT:   Head: Normocephalic.   Eyes: EOM are normal.   Neck: Neck supple.   Normal range of motion.  Cardiovascular:  Normal rate, regular rhythm, normal heart sounds and intact distal pulses.           Pulmonary/Chest: Breath sounds normal.   Abdominal: Abdomen is soft. Bowel sounds are normal.   Musculoskeletal:         General: Normal range of motion.      Cervical back: Normal range of motion and neck supple.     Neurological: He is alert and oriented to person, place, and time. He has normal strength.   Skin: Skin is warm and dry. Capillary refill takes less than 2 seconds.   Psychiatric: He has a normal mood and affect. His behavior is normal. Judgment and thought content normal.         ED Course   Procedures  Labs Reviewed   COMPREHENSIVE METABOLIC PANEL - Abnormal       Result Value    Sodium 137      Potassium 4.6      Chloride 101      CO2 28      Glucose 492 (*)     Blood Urea Nitrogen 18.0      Creatinine 1.32 (*)     Calcium 9.5      Protein Total 6.7      Albumin 3.4 (*)     Globulin 3.3      Albumin/Globulin Ratio 1.0 (*)     Bilirubin Total 0.3      ALP 85      ALT 18      AST 11      eGFR >60      Anion Gap 8.0      BUN/Creatinine Ratio 14     CBC WITH DIFFERENTIAL - Abnormal    WBC 9.42      RBC 4.91      Hgb 15.5      Hct 44.7      MCV 91.0      MCH 31.6 (*)     MCHC 34.7      RDW 11.8      Platelet 162      MPV 12.9 (*)     Neut % 58.0      Lymph % 32.0      Mono % 7.4      Eos % 1.5      Basophil % 0.7      Lymph # 3.01      Neut # 5.46      Mono # 0.70      Eos # 0.14      Baso # 0.07      IG# 0.04      IG% 0.4     POCT GLUCOSE - Abnormal    POCT Glucose 349 (*)    D DIMER, QUANTITATIVE - Normal    D-Dimer <0.27     CBC W/ AUTO DIFFERENTIAL    Narrative:     The following orders were created for panel order CBC auto differential.  Procedure                               Abnormality         Status                     ---------                               -----------          ------                     CBC with Differential[[id number]]       Abnormal            Final result                 Please view results for these tests on the individual orders.   POCT GLUCOSE MONITORING CONTINUOUS          Imaging Results    None          Medications   insulin regular injection 10 Units 0.1 mL (10 Units Intravenous Incomplete 12/13/24 1859)   sodium chloride 0.9% bolus 1,000 mL 1,000 mL (0 mLs Intravenous Stopped 12/13/24 1847)     Medical Decision Making  Historian:  Patient.  Patient is a White 54 y.o. male that presents with bilateral calf pain that has been present few days. Associated symptoms nothing. Surrounding information is history of a DVT on Eliquis. Exacerbated by nothing. Relieved by nothing. Patient treatment prior to arrival none. Risk factors include none. Other history pertaining to this complaint nothing.   Assessment:  See physical exam.  DD:  DVT, neuropathy, cramps  ED Course: History was obtained.  Physical was performed.  Patient's D-dimer was normal.  Patient is currently on Eliquis.  Due to the polyps he had this hospital I am not allowed to get an ultrasound after hours with a negative D-dimer.  Patient's physician has not set up for an ultrasound on Monday or Tuesday.  His sugar was elevated.  I did give him fluids and insulin which decreased his blood sugar.  Patient's leg pain did improve. Medical or surgical consults:  None. Social determinants that affect healthcare:  None.       Amount and/or Complexity of Data Reviewed  Labs: ordered.    Risk  OTC drugs.               ED Course as of 12/13/24 1901   Fri Dec 13, 2024   1715 Anion gap 8.0 [CL]      ED Course User Index  [CL] Bart Sharif FNP                           Clinical Impression:  Final diagnoses:  [M79.604, M79.605] Pain in both lower extremities (Primary)  [R73.9] Hyperglycemia                 Bart Sharif FNP  12/13/24 1901       Bart Sharif FNP  12/13/24 1904

## 2025-03-06 DIAGNOSIS — J44.9 CHRONIC OBSTRUCTIVE PULMONARY DISEASE, UNSPECIFIED COPD TYPE: Primary | ICD-10-CM

## 2025-03-06 DIAGNOSIS — J44.9 VANISHING LUNG: Primary | ICD-10-CM

## 2025-03-11 ENCOUNTER — HOSPITAL ENCOUNTER (OUTPATIENT)
Dept: RADIOLOGY | Facility: HOSPITAL | Age: 55
Discharge: HOME | End: 2025-03-11
Attending: PEDIATRICS
Payer: MEDICAID

## 2025-03-11 DIAGNOSIS — J44.9 VANISHING LUNG: ICD-10-CM

## 2025-03-11 PROCEDURE — 71260 CT THORAX DX C+: CPT | Mod: TC

## 2025-03-11 PROCEDURE — 25500020 PHARM REV CODE 255: Performed by: PEDIATRICS

## 2025-03-11 RX ORDER — IOPAMIDOL 755 MG/ML
100 INJECTION, SOLUTION INTRAVASCULAR
Status: COMPLETED | OUTPATIENT
Start: 2025-03-11 | End: 2025-03-11

## 2025-03-11 RX ADMIN — IOPAMIDOL 100 ML: 755 INJECTION, SOLUTION INTRAVENOUS at 12:03

## 2025-03-24 ENCOUNTER — LAB VISIT (OUTPATIENT)
Dept: LAB | Facility: HOSPITAL | Age: 55
End: 2025-03-24
Attending: EMERGENCY MEDICINE
Payer: MEDICAID

## 2025-03-24 DIAGNOSIS — E11.9 DIABETES: Primary | ICD-10-CM

## 2025-03-24 LAB
EST. AVERAGE GLUCOSE BLD GHB EST-MCNC: 329.3 MG/DL
HBA1C MFR BLD: 13.1 %

## 2025-03-24 PROCEDURE — 36415 COLL VENOUS BLD VENIPUNCTURE: CPT

## 2025-03-24 PROCEDURE — 83036 HEMOGLOBIN GLYCOSYLATED A1C: CPT

## 2025-06-17 ENCOUNTER — OFFICE VISIT (OUTPATIENT)
Dept: PULMONOLOGY | Facility: CLINIC | Age: 55
End: 2025-06-17
Payer: MEDICAID

## 2025-06-17 VITALS
TEMPERATURE: 98 F | HEART RATE: 80 BPM | SYSTOLIC BLOOD PRESSURE: 149 MMHG | OXYGEN SATURATION: 98 % | BODY MASS INDEX: 26.31 KG/M2 | RESPIRATION RATE: 20 BRPM | WEIGHT: 177.63 LBS | HEIGHT: 69 IN | DIASTOLIC BLOOD PRESSURE: 78 MMHG

## 2025-06-17 DIAGNOSIS — R05.3 CHRONIC COUGH: Primary | ICD-10-CM

## 2025-06-17 PROCEDURE — 3008F BODY MASS INDEX DOCD: CPT | Mod: CPTII,,, | Performed by: INTERNAL MEDICINE

## 2025-06-17 PROCEDURE — 99203 OFFICE O/P NEW LOW 30 MIN: CPT | Mod: S$PBB,,, | Performed by: INTERNAL MEDICINE

## 2025-06-17 PROCEDURE — 3078F DIAST BP <80 MM HG: CPT | Mod: CPTII,,, | Performed by: INTERNAL MEDICINE

## 2025-06-17 PROCEDURE — 3077F SYST BP >= 140 MM HG: CPT | Mod: CPTII,,, | Performed by: INTERNAL MEDICINE

## 2025-06-17 PROCEDURE — 1159F MED LIST DOCD IN RCRD: CPT | Mod: CPTII,,, | Performed by: INTERNAL MEDICINE

## 2025-06-17 PROCEDURE — 3046F HEMOGLOBIN A1C LEVEL >9.0%: CPT | Mod: CPTII,,, | Performed by: INTERNAL MEDICINE

## 2025-06-17 PROCEDURE — 99215 OFFICE O/P EST HI 40 MIN: CPT | Mod: PBBFAC

## 2025-06-17 RX ORDER — AZELASTINE HYDROCHLORIDE, FLUTICASONE PROPIONATE 137; 50 UG/1; UG/1
1 SPRAY, METERED NASAL 2 TIMES DAILY
Qty: 23 G | Refills: 3 | Status: SHIPPED | OUTPATIENT
Start: 2025-06-17

## 2025-06-17 NOTE — PROGRESS NOTES
"[facility] Visit    Chief Complaint:      copd/emphysema  (New referral)     Subjective:     HPI:  Oz Sher Jr. is a 55 y.o. male with PMH of diabetes and chronic cough , smoking   He presents to Pulmonology clinic today for copd/emphysema  (New referral).  The patient reported that he has been having cough for 4-5 years and most f the times its dry and some times associated with clear sputum , and he stated that its triggered by perfumes/air fresheners/ smells/hot air and AC vent and dust and reported to have history of allergies. He reported to have incarcerated multiple times a total 5 years in 2004( 3 years) and 2012(  2 years) . He do have a Pft in march but that's not a well performed test, Ct chest done in march has no significant findings.  His PCP prescribed him albuterol inhaler but little improvement of symptoms with it.   He do have night time cough but denied cough after food intake or worsening cough when lying down. Denied  heart burn.    He reported to have smoking a pack a day with more than 20 pack years        Review of Systems  Review of Systems   Constitutional:  Positive for malaise/fatigue.   HENT: Negative.     Respiratory:  Positive for cough and sputum production (occasionally). Negative for shortness of breath.    Cardiovascular: Negative.    Genitourinary: Negative.    Skin: Negative.    Neurological: Negative.    Psychiatric/Behavioral:  The patient is nervous/anxious.          Objective:   Last 24 Hour Vital Signs:  Vitals  BP: (!) 149/78  Temp: 98 °F (36.7 °C)  Temp Source: Oral  Pulse: 80  Resp: 20  SpO2: 98 %  Height: 5' 9" (175.3 cm)  Weight: 80.6 kg (177 lb 9.6 oz)    Physical Examination:  Physical Exam  Constitutional:       Appearance: Normal appearance.   Cardiovascular:      Rate and Rhythm: Normal rate.   Pulmonary:      Effort: Pulmonary effort is normal. No respiratory distress.      Breath sounds: Normal breath sounds. No wheezing or rales.   Abdominal:     " " General: Bowel sounds are normal.   Musculoskeletal:      Right lower leg: No edema.      Left lower leg: No edema.   Skin:     General: Skin is warm.   Neurological:      Mental Status: He is alert.          Labs  CBC:   Lab Results   Component Value Date    WBC 9.42 12/13/2024    HGB 15.5 12/13/2024    HCT 44.7 12/13/2024     12/13/2024    MCV 91.0 12/13/2024    RDW 11.8 12/13/2024     BMP:   Lab Results   Component Value Date     12/13/2024    K 4.6 12/13/2024    CO2 28 12/13/2024    BUN 18.0 12/13/2024    CREATININE 1.32 (H) 12/13/2024    CALCIUM 9.5 12/13/2024     LFTs:   Lab Results   Component Value Date    PROT 6.7 12/13/2024    ALBUMIN 3.4 (L) 12/13/2024    BILITOT 0.3 12/13/2024    BILIDIR 0.2 10/29/2021    IBILI 0.20 10/29/2021    AST 11 12/13/2024    ALKPHOS 85 12/13/2024    ALT 18 12/13/2024     Coags:   Lab Results   Component Value Date    INR 1.0 (L) 10/21/2021    PROTIME 13.5 10/21/2021     FLP:   Lab Results   Component Value Date    CHOL 216 (H) 10/28/2024    HDL 35 10/28/2024    .00 10/28/2024    TRIG 262 (H) 10/28/2024     DM:   Lab Results   Component Value Date    HGBA1C 13.1 (H) 03/24/2025    HGBA1C 12.0 (H) 10/28/2024    HGBA1C 10.3 (H) 09/12/2023    CREATININE 1.32 (H) 12/13/2024     Thyroid:   Lab Results   Component Value Date    TSH 2.113 10/28/2024    [id number] 1.06 10/28/2024     Anemia: No results found for: "IRON", "FERRITIN", "TRANS", "TIBC", "[id number]", "FOLATE"  Cardiac:   Lab Results   Component Value Date    TROPONINI <0.010 10/29/2021    CPK 77 10/29/2021    CPKMB 1.4 10/29/2021    BNP <10.0 10/29/2021     Urinalysis:   Lab Results   Component Value Date    COLORU Yellow 10/28/2024    NITRITE Negative 10/28/2024    KETONESU Negative 10/29/2021    UROBILINOGEN 0.2 10/28/2024    WBCUA None Seen 10/28/2024       Trended Cardiac Data:  @LABRCNTIP(troponini:3,CPK:3,CPKMB:3,bnp:2)@   Last PFTs:  No results found for: "PREFVC", "PREFEV1", "[id number]", " ""PRETLC", "PREDLCO"    Radiology:  No results found in the last 24 hours.     Assessment & Plan:   Chronic cough   - asthma vs PND vs GERD  - will do methacholine challenge test   - ordered fluticasone nasal spray for congestion and PND  - barium swallow to rule out  GERD  - will repeat Pft as the one in march was poorly performed   - counseled about smoking cessation   SHIREEN RAJPUT MD  Internal Medicine - PGY-1        "

## 2025-07-01 ENCOUNTER — HOSPITAL ENCOUNTER (OUTPATIENT)
Dept: RADIOLOGY | Facility: HOSPITAL | Age: 55
Discharge: HOME | End: 2025-07-01
Attending: INTERNAL MEDICINE
Payer: MEDICAID

## 2025-07-01 DIAGNOSIS — R05.3 CHRONIC COUGH: ICD-10-CM

## 2025-07-01 PROCEDURE — 74220 X-RAY XM ESOPHAGUS 1CNTRST: CPT | Mod: TC

## 2025-07-21 ENCOUNTER — HOSPITAL ENCOUNTER (EMERGENCY)
Facility: HOSPITAL | Age: 55
Discharge: HOME | End: 2025-07-21
Attending: INTERNAL MEDICINE
Payer: MEDICAID

## 2025-07-21 VITALS
HEIGHT: 69 IN | WEIGHT: 176 LBS | DIASTOLIC BLOOD PRESSURE: 90 MMHG | SYSTOLIC BLOOD PRESSURE: 165 MMHG | HEART RATE: 85 BPM | BODY MASS INDEX: 26.07 KG/M2 | RESPIRATION RATE: 16 BRPM | OXYGEN SATURATION: 98 % | TEMPERATURE: 98 F

## 2025-07-21 VITALS
BODY MASS INDEX: 25.92 KG/M2 | WEIGHT: 175 LBS | SYSTOLIC BLOOD PRESSURE: 140 MMHG | OXYGEN SATURATION: 99 % | HEART RATE: 88 BPM | TEMPERATURE: 98 F | DIASTOLIC BLOOD PRESSURE: 80 MMHG | RESPIRATION RATE: 20 BRPM | HEIGHT: 69 IN

## 2025-07-21 DIAGNOSIS — R31.0 GROSS HEMATURIA: Primary | ICD-10-CM

## 2025-07-21 DIAGNOSIS — R31.9 HEMATURIA, UNSPECIFIED TYPE: Primary | ICD-10-CM

## 2025-07-21 LAB
ALBUMIN SERPL-MCNC: 3.3 G/DL (ref 3.5–5)
ALBUMIN SERPL-MCNC: 3.9 G/DL (ref 3.5–5)
ALBUMIN/GLOB SERPL: 0.9 RATIO (ref 1.1–2)
ALBUMIN/GLOB SERPL: 1 RATIO (ref 1.1–2)
ALP SERPL-CCNC: 83 UNIT/L (ref 40–150)
ALP SERPL-CCNC: 94 UNIT/L (ref 40–150)
ALT SERPL-CCNC: 15 UNIT/L (ref 0–55)
ALT SERPL-CCNC: 17 UNIT/L (ref 0–55)
ANION GAP SERPL CALC-SCNC: 11 MEQ/L
ANION GAP SERPL CALC-SCNC: 7 MEQ/L
APTT PPP: 24.4 SECONDS (ref 24.2–35.9)
AST SERPL-CCNC: 13 UNIT/L (ref 11–45)
AST SERPL-CCNC: 13 UNIT/L (ref 11–45)
BACTERIA #/AREA URNS AUTO: ABNORMAL /HPF
BACTERIA #/AREA URNS AUTO: ABNORMAL /HPF
BASOPHILS # BLD AUTO: 0.06 X10(3)/MCL
BASOPHILS # BLD AUTO: 0.09 X10(3)/MCL
BASOPHILS NFR BLD AUTO: 0.7 %
BASOPHILS NFR BLD AUTO: 1 %
BILIRUB SERPL-MCNC: 0.5 MG/DL
BILIRUB SERPL-MCNC: 0.6 MG/DL
BILIRUB UR QL STRIP.AUTO: NEGATIVE
BILIRUB UR QL STRIP.AUTO: NEGATIVE
BUN SERPL-MCNC: 16.1 MG/DL (ref 8.4–25.7)
BUN SERPL-MCNC: 17 MG/DL (ref 8.4–25.7)
CALCIUM SERPL-MCNC: 9.7 MG/DL (ref 8.4–10.2)
CALCIUM SERPL-MCNC: 9.7 MG/DL (ref 8.4–10.2)
CHLORIDE SERPL-SCNC: 102 MMOL/L (ref 98–107)
CHLORIDE SERPL-SCNC: 102 MMOL/L (ref 98–107)
CLARITY UR: ABNORMAL
CLARITY UR: ABNORMAL
CO2 SERPL-SCNC: 25 MMOL/L (ref 22–29)
CO2 SERPL-SCNC: 28 MMOL/L (ref 22–29)
COLOR UR AUTO: ABNORMAL
COLOR UR AUTO: ABNORMAL
CREAT SERPL-MCNC: 1.11 MG/DL (ref 0.72–1.25)
CREAT SERPL-MCNC: 1.27 MG/DL (ref 0.72–1.25)
CREAT/UREA NIT SERPL: 13
CREAT/UREA NIT SERPL: 15
EOSINOPHIL # BLD AUTO: 0.12 X10(3)/MCL (ref 0–0.9)
EOSINOPHIL # BLD AUTO: 0.18 X10(3)/MCL (ref 0–0.9)
EOSINOPHIL NFR BLD AUTO: 1.3 %
EOSINOPHIL NFR BLD AUTO: 2.1 %
ERYTHROCYTE [DISTWIDTH] IN BLOOD BY AUTOMATED COUNT: 11.7 % (ref 11.5–17)
ERYTHROCYTE [DISTWIDTH] IN BLOOD BY AUTOMATED COUNT: 11.8 % (ref 11.5–17)
GFR SERPLBLD CREATININE-BSD FMLA CKD-EPI: >60 ML/MIN/1.73/M2
GFR SERPLBLD CREATININE-BSD FMLA CKD-EPI: >60 ML/MIN/1.73/M2
GLOBULIN SER-MCNC: 3.6 GM/DL (ref 2.4–3.5)
GLOBULIN SER-MCNC: 3.8 GM/DL (ref 2.4–3.5)
GLUCOSE SERPL-MCNC: 360 MG/DL (ref 74–100)
GLUCOSE SERPL-MCNC: 361 MG/DL (ref 74–100)
GLUCOSE UR QL STRIP: ABNORMAL
GLUCOSE UR QL STRIP: ABNORMAL
GROUP & RH: NORMAL
HCT VFR BLD AUTO: 46.2 % (ref 42–52)
HCT VFR BLD AUTO: 49.1 % (ref 42–52)
HGB BLD-MCNC: 15.9 G/DL (ref 14–18)
HGB BLD-MCNC: 16.8 G/DL (ref 14–18)
HGB UR QL STRIP: ABNORMAL
HGB UR QL STRIP: ABNORMAL
IMM GRANULOCYTES # BLD AUTO: 0.05 X10(3)/MCL (ref 0–0.04)
IMM GRANULOCYTES # BLD AUTO: 0.05 X10(3)/MCL (ref 0–0.04)
IMM GRANULOCYTES NFR BLD AUTO: 0.5 %
IMM GRANULOCYTES NFR BLD AUTO: 0.6 %
INDIRECT COOMBS: NORMAL
INR PPP: 0.9
KETONES UR QL STRIP: NEGATIVE
KETONES UR QL STRIP: NEGATIVE
LEUKOCYTE ESTERASE UR QL STRIP: NEGATIVE
LEUKOCYTE ESTERASE UR QL STRIP: NEGATIVE
LYMPHOCYTES # BLD AUTO: 2.63 X10(3)/MCL (ref 0.6–4.6)
LYMPHOCYTES # BLD AUTO: 2.94 X10(3)/MCL (ref 0.6–4.6)
LYMPHOCYTES NFR BLD AUTO: 28.9 %
LYMPHOCYTES NFR BLD AUTO: 34.1 %
MCH RBC QN AUTO: 30.7 PG (ref 27–31)
MCH RBC QN AUTO: 31 PG (ref 27–31)
MCHC RBC AUTO-ENTMCNC: 34.2 G/DL (ref 33–36)
MCHC RBC AUTO-ENTMCNC: 34.4 G/DL (ref 33–36)
MCV RBC AUTO: 89.6 FL (ref 80–94)
MCV RBC AUTO: 90.1 FL (ref 80–94)
MONOCYTES # BLD AUTO: 0.65 X10(3)/MCL (ref 0.1–1.3)
MONOCYTES # BLD AUTO: 0.75 X10(3)/MCL (ref 0.1–1.3)
MONOCYTES NFR BLD AUTO: 7.1 %
MONOCYTES NFR BLD AUTO: 8.7 %
NEUTROPHILS # BLD AUTO: 4.61 X10(3)/MCL (ref 2.1–9.2)
NEUTROPHILS # BLD AUTO: 5.59 X10(3)/MCL (ref 2.1–9.2)
NEUTROPHILS NFR BLD AUTO: 53.5 %
NEUTROPHILS NFR BLD AUTO: 61.5 %
NITRITE UR QL STRIP: NEGATIVE
NITRITE UR QL STRIP: NEGATIVE
NRBC BLD AUTO-RTO: 0 %
NRBC BLD AUTO-RTO: 0 %
PH UR STRIP: 5.5 [PH]
PH UR STRIP: 6.5 [PH]
PLATELET # BLD AUTO: 159 X10(3)/MCL (ref 130–400)
PLATELET # BLD AUTO: 187 X10(3)/MCL (ref 130–400)
PMV BLD AUTO: 12.2 FL (ref 7.4–10.4)
PMV BLD AUTO: 12.4 FL (ref 7.4–10.4)
POTASSIUM SERPL-SCNC: 4.6 MMOL/L (ref 3.5–5.1)
POTASSIUM SERPL-SCNC: 4.8 MMOL/L (ref 3.5–5.1)
PROT SERPL-MCNC: 6.9 GM/DL (ref 6.4–8.3)
PROT SERPL-MCNC: 7.7 GM/DL (ref 6.4–8.3)
PROT UR QL STRIP: ABNORMAL
PROT UR QL STRIP: ABNORMAL
PROTHROMBIN TIME: 12.9 SECONDS (ref 12.4–14.9)
RBC # BLD AUTO: 5.13 X10(6)/MCL (ref 4.7–6.1)
RBC # BLD AUTO: 5.48 X10(6)/MCL (ref 4.7–6.1)
RBC #/AREA URNS AUTO: >100 /HPF
RBC #/AREA URNS AUTO: ABNORMAL /HPF
SODIUM SERPL-SCNC: 137 MMOL/L (ref 136–145)
SODIUM SERPL-SCNC: 138 MMOL/L (ref 136–145)
SP GR UR STRIP.AUTO: 1.02 (ref 1–1.03)
SP GR UR STRIP.AUTO: 1.04 (ref 1–1.03)
SPECIMEN OUTDATE: NORMAL
SQUAMOUS #/AREA URNS AUTO: ABNORMAL /HPF
SQUAMOUS #/AREA URNS LPF: ABNORMAL /HPF
UROBILINOGEN UR STRIP-ACNC: 0.2
UROBILINOGEN UR STRIP-ACNC: NORMAL
WBC # BLD AUTO: 8.62 X10(3)/MCL (ref 4.5–11.5)
WBC # BLD AUTO: 9.1 X10(3)/MCL (ref 4.5–11.5)
WBC #/AREA URNS AUTO: ABNORMAL /HPF
WBC #/AREA URNS AUTO: ABNORMAL /HPF

## 2025-07-21 PROCEDURE — 99284 EMERGENCY DEPT VISIT MOD MDM: CPT | Mod: 25

## 2025-07-21 PROCEDURE — 25000003 PHARM REV CODE 250: Performed by: INTERNAL MEDICINE

## 2025-07-21 PROCEDURE — 85025 COMPLETE CBC W/AUTO DIFF WBC: CPT | Performed by: PHYSICIAN ASSISTANT

## 2025-07-21 PROCEDURE — 85610 PROTHROMBIN TIME: CPT | Performed by: INTERNAL MEDICINE

## 2025-07-21 PROCEDURE — 81001 URINALYSIS AUTO W/SCOPE: CPT | Performed by: INTERNAL MEDICINE

## 2025-07-21 PROCEDURE — 80053 COMPREHEN METABOLIC PANEL: CPT | Performed by: INTERNAL MEDICINE

## 2025-07-21 PROCEDURE — 81001 URINALYSIS AUTO W/SCOPE: CPT | Performed by: PHYSICIAN ASSISTANT

## 2025-07-21 PROCEDURE — 85730 THROMBOPLASTIN TIME PARTIAL: CPT | Performed by: INTERNAL MEDICINE

## 2025-07-21 PROCEDURE — 25000003 PHARM REV CODE 250: Performed by: PHYSICIAN ASSISTANT

## 2025-07-21 PROCEDURE — 85025 COMPLETE CBC W/AUTO DIFF WBC: CPT | Performed by: INTERNAL MEDICINE

## 2025-07-21 PROCEDURE — 86850 RBC ANTIBODY SCREEN: CPT | Performed by: PHYSICIAN ASSISTANT

## 2025-07-21 PROCEDURE — 80053 COMPREHEN METABOLIC PANEL: CPT | Performed by: PHYSICIAN ASSISTANT

## 2025-07-21 PROCEDURE — 96360 HYDRATION IV INFUSION INIT: CPT

## 2025-07-21 PROCEDURE — 99284 EMERGENCY DEPT VISIT MOD MDM: CPT | Mod: 25,27

## 2025-07-21 RX ADMIN — SODIUM CHLORIDE 1000 ML: 9 INJECTION, SOLUTION INTRAVENOUS at 08:07

## 2025-07-21 RX ADMIN — SODIUM CHLORIDE 1000 ML: 9 INJECTION, SOLUTION INTRAVENOUS at 02:07

## 2025-07-21 NOTE — DISCHARGE INSTRUCTIONS
Call the physician who prescribes ELIQUIS and ask them if you can stop the eliquis for now with hematuria    GO HOME AND REST!!! DRINK PLENTY PLENTY PLENTY WATER    If you are unable to urinate, return to the ER immediately     Referral to Dr. Rooney for urology     Follow up with PCP     Take medicines as prescribed    See your family doctor in one to 2 days for further evaluation, workup, and treatment as necessary    Avoid driving or operating machinery while taking medicines as some medicines might cause drowsiness and may cause problems. Also pain medicines have potential of being addictive  so use Pain meds specially Narcotics Sparingly.    The exam and treatment you received in Emergency Room was for an urgent problem and NOT INTENDED AS COMPLETE CARE. It is important that you FOLLOW UP with a doctor for ongoing care. If your symptoms become WORSE or you DO NOT IMPROVE and you are unable to reach your health care provider, you should RETURN to the emergency department. The Emergency Room doctor has provided a PRELIMINARY INTERPRETATION of all your STUDIES. A final interpretation may be done after you are discharged. IF A CHANGE in your diagnosis or treatment is needed WE WILL CONTACT YOU. It is critical that we have a CURRENT PHONE NUMBER FOR YOU.

## 2025-07-21 NOTE — ED PROVIDER NOTES
Encounter Date: 7/21/2025       History     Chief Complaint   Patient presents with    Hematuria     Pt c/o blood in urine since last night at 1230. Reports brown / red at first, then bright red this morning. Denies dysuria, fevers. Seen in [place] this morning for same symptoms. On eliquis for prior DVT.     See [facility] for details.      The history is provided by the patient, the spouse and medical records. No  was used.     Review of patient's allergies indicates:  No Known Allergies  Past Medical History:   Diagnosis Date    Anxiety 07/26/2018    Chronic obstructive lung disease 07/26/2018    Depressive disorder 07/26/2018    Diabetes mellitus     Diverticular disease 07/26/2018    DVT (deep venous thrombosis)     Essential hypertension 09/26/2019    Gastroesophageal reflux disease 07/26/2018    Hypercholesterolemia 10/04/2019    Hypothyroidism 07/26/2018    Rheumatoid arthritis, unspecified     Smoker 05/14/2021     No past surgical history on file.  Family History   Problem Relation Name Age of Onset    Arthritis Mother      Cancer Father       Social History[1]  Review of Systems   Constitutional: Negative.  Negative for activity change, appetite change, diaphoresis, fatigue and fever.   HENT:  Negative for rhinorrhea and sinus pressure.    Eyes: Negative.    Respiratory: Negative.  Negative for chest tightness.    Cardiovascular:  Negative for chest pain.   Gastrointestinal: Negative.  Negative for abdominal distention and abdominal pain.   Endocrine: Negative.    Genitourinary:  Positive for flank pain and hematuria.   Musculoskeletal:  Positive for arthralgias.   Allergic/Immunologic: Negative.    Neurological:  Negative for dizziness and headaches.   Hematological: Negative.    Psychiatric/Behavioral: Negative.     All other systems reviewed and are negative.      Physical Exam     Initial Vitals [07/21/25 1339]   BP Pulse Resp Temp SpO2   131/84 88 20 98.3 °F (36.8 °C) 98 %      MAP        --         Physical Exam    Nursing note and vitals reviewed.  Constitutional: He appears well-developed and well-nourished. He is cooperative. No distress.   HENT:   Head: Atraumatic. Not macrocephalic.   Right Ear: Tympanic membrane normal. Tympanic membrane is not erythematous.   Left Ear: Tympanic membrane normal. Tympanic membrane is not erythematous.   Nose: No mucosal edema. Right sinus exhibits no frontal sinus tenderness. Left sinus exhibits no frontal sinus tenderness. Mouth/Throat: Mucous membranes are normal.   Cardiovascular:  Normal rate.           Pulmonary/Chest: Effort normal. No respiratory distress. He has no decreased breath sounds. He has no wheezes. He has no rhonchi. He exhibits no tenderness.   Abdominal: Abdomen is soft. Bowel sounds are normal. He exhibits no distension and no mass. There is no abdominal tenderness.   No right CVA tenderness.  No left CVA tenderness. There is no rebound and no guarding.   Musculoskeletal:         General: Normal range of motion.        Arms:       Comments: Tenderness right thoracic paraspinal muscles.      Lymphadenopathy:        Head (right side): No submental adenopathy present.        Head (left side): No submental adenopathy present.   Neurological: He is alert and oriented to person, place, and time. He has normal strength. GCS score is 15. GCS eye subscore is 4. GCS verbal subscore is 5. GCS motor subscore is 6.   Skin: Skin is warm.   Psychiatric: He has a normal mood and affect. His behavior is normal. Judgment and thought content normal.         ED Course   Procedures  Labs Reviewed   COMPREHENSIVE METABOLIC PANEL - Abnormal       Result Value    Sodium 138      Potassium 4.6      Chloride 102      CO2 25      Glucose 360 (*)     Blood Urea Nitrogen 16.1      Creatinine 1.27 (*)     Calcium 9.7      Protein Total 7.7      Albumin 3.9      Globulin 3.8 (*)     Albumin/Globulin Ratio 1.0 (*)     Bilirubin Total 0.6      ALP 94      ALT 17      AST  13      eGFR >60      Anion Gap 11.0      BUN/Creatinine Ratio 13     URINALYSIS, REFLEX TO URINE CULTURE - Abnormal    Color, UA Light-Orange (*)     Appearance, UA Turbid (*)     Specific Gravity, UA 1.038 (*)     pH, UA 5.5      Protein, UA 1+ (*)     Glucose, UA 4+ (*)     Ketones, UA Negative      Blood, UA 3+ (*)     Bilirubin, UA Negative      Urobilinogen, UA Normal      Nitrites, UA Negative      Leukocyte Esterase, UA Negative      RBC, UA >100 (*)     WBC, UA None Seen      Bacteria, UA None Seen      Squamous Epithelial Cells, UA None Seen     CBC WITH DIFFERENTIAL - Abnormal    WBC 9.10      RBC 5.48      Hgb 16.8      Hct 49.1      MCV 89.6      MCH 30.7      MCHC 34.2      RDW 11.7      Platelet 187      MPV 12.4 (*)     Neut % 61.5      Lymph % 28.9      Mono % 7.1      Eos % 1.3      Basophil % 0.7      Imm Grans % 0.5      Neut # 5.59      Lymph # 2.63      Mono # 0.65      Eos # 0.12      Baso # 0.06      Imm Gran # 0.05 (*)     NRBC% 0.0     CBC W/ AUTO DIFFERENTIAL    Narrative:     The following orders were created for panel order CBC auto differential.  Procedure                               Abnormality         Status                     ---------                               -----------         ------                     CBC with Differential[[id number]]       Abnormal            Final result                 Please view results for these tests on the individual orders.   TYPE & SCREEN    Group & Rh A POS      Indirect Jacy GEL NEG      Specimen Outdate 07/24/2025 23:59     ABORH RETYPE          Imaging Results    None          Medications   sodium chloride 0.9% bolus 1,000 mL 1,000 mL (0 mLs Intravenous Stopped 7/21/25 1500)     Medical Decision Making  55yoWM w/hx of anxiety, COPD, DM2, DVT,, HTN, GERD, HLD, and RA presents to the emergency department with hematuria that began yesterday.  States the hematuria is only at the end of urinary stream.  Denies nausea, vomiting, fever, chills,  chest pain, or difficulty breathing.  Denies abdominal pain.  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  Patient is on Eliquis for history of DVTs.  Of note, patient was evaluated and treated for hematuria at [place] emergency department this morning for the same complaint.  Nothing has changed since that visit, except he feels like there may have been more blood.  Patient told to rest after he left the emergency department.  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  Patient here for again same complaint.    Problems Addressed:  Hematuria, unspecified type: acute illness or injury     Details: Differential diagnosis included but not limited to:  Nephrolithiasis, pyelonephritis, cystitis, urinary tract infection, other etiology     Patient has no evidence of nephrolithiasis or pyelonephritis on imaging completed today.  No leukocytes or nitrites in urine to suggest acute infection or infected stone.  Patient only has blood at the end of his urinary stream so less likely need for emergent cystoscope.  Consulted Urology, Milka Cardoza NP, who recommends outpatient cystoscopy and for patient to consult with his prescribing doctor for his Eliquis- to discontinue or hold. Recommend lots of fluids and rest.  All questions asked and answered at the time of the visit. Strict ER precautions given. Patient and family members agreeable to plan.       Amount and/or Complexity of Data Reviewed  Labs: ordered. Decision-making details documented in ED Course.               ED Course as of 07/21/25 1531   Mon Jul 21, 2025   1424 Color, UA(!): Light-Orange [ST]   1424 Specific Gravity,UA(!): 1.038 [ST]   1424 Blood, UA(!): 3+ [ST]   1424 RBC, UA(!): >100 [ST]   1434 Urology paged  [ST]   1432 CT RENAL STUDIES TODAY @ [facility] [place] [facility]    Impression:     1. No obstructive uropathy identified  2.  Suspect poorly visualized cyst posteroinferior left kidney measuring 1.2 cm.  Sonogram would allow further evaluation.  3. Findings of constipation  4. Mild diverticulosis coli  5. Findings and other details as above        Electronically signed by:Freddie Fox  Date:                                            07/21/2025  Time:                                           09:09      Exam Ended: 07/21/25 08:44 CDT Last Resulted: 07/21/25 09:09 CDT        [ST]      ED Course User Index  [ST] Elva Isaacs PA                               Clinical Impression:  Final diagnoses:  [R31.9] Hematuria, unspecified type (Primary)          ED Disposition Condition    Discharge Stable          ED Prescriptions    None       Follow-up Information       Follow up With Specialties Details Why Contact Info    [facility] - Emergency Dept Emergency Medicine  As needed, If symptoms worsen [address] [facility] [id number] [phone]    Almas Crockett MD Family Medicine Call   [address]  [facility] [address]  [phone]      Sha Rooney MD Urology Call   [address] [facility] [address]  [facility] [address]  [phone]            This note was typed partially using voice recognition software.  Please be reminded that not all corrections/addendums to grammar may have been made prior to closing of this chart.           [1]   Social History  Tobacco Use    Smoking status: Every Day     Current packs/day: 1.00     Average packs/day: 1 pack/day for 29.6 years (29.6 ttl pk-yrs)     Types: Cigarettes     Start date: 1996    Smokeless tobacco: Never   Vaping Use    Vaping status: Never Used   Substance Use Topics    Alcohol use: Never    Drug use: Never        Elva Isaacs PA  07/21/25 1532

## 2025-07-21 NOTE — ED PROVIDER NOTES
07/21/2025         8:06 AM    Source of History:  History obtained from patient and significant other.     Chief complaint:  From Nurse Triage:  Flank Pain (Right flank pain and blood in urine that started last night)    HISTORY OF PRESENT ILLNES:  Oz Sher Jr. is a 55 y.o. male  has a past medical history of Anxiety (07/26/2018), Chronic obstructive lung disease (07/26/2018), Depressive disorder (07/26/2018), Diabetes mellitus, Diverticular disease (07/26/2018), DVT (deep venous thrombosis), Essential hypertension (09/26/2019), Gastroesophageal reflux disease (07/26/2018), Hypercholesterolemia (10/04/2019), Hypothyroidism (07/26/2018), Rheumatoid arthritis, unspecified, and Smoker (05/14/2021). presenting with Flank Pain (Right flank pain and blood in urine that started last night) patient states he has had some intermittent flank pain for the past few days but started having dark colored urine last night.  Patient had some peng blood this morning.  Patient is on Eliquis.  No pain at present.  Patient denies trauma.      REVIEW OF SYSTEMS:   Constitutional symptoms:     Skin symptoms:      Eye symptoms:     ENMT symptoms:      Respiratory symptoms:      Cardiovascular symptoms:     Gastrointestinal symptoms:      Genitourinary symptoms:     Musculoskeletal symptoms:      Neurologic symptoms:      Psychiatric symptoms:               Additional review of systems information: Patient Denies Any Other Complaints.    All Other Systems Reviewed With Patient And Negative.    ALLEGIES:  Review of patient's allergies indicates:  No Known Allergies    MEDICINE LIST:  Current Outpatient Medications   Medication Instructions    albuterol (PROVENTIL) 2.5 mg /3 mL (0.083 %) nebulizer solution USE 1 VIAL PER NEBULIZER EVERY 4 HOURS AS NEEDED FOR COUGH    albuterol (PROVENTIL/VENTOLIN HFA) 90 mcg/actuation inhaler 2 puffs, Every 4 hours PRN    azelastine-fluticasone (DYMISTA) 137-50 mcg/spray Spry nassal spray 1 spray, Each  Nostril, 2 times daily    baclofen (LIORESAL) 10 mg, Oral, 3 times daily    ELIQUIS 5 mg, 2 times daily    erythromycin (ROMYCIN) ophthalmic ointment Place a 1/2 inch ribbon of ointment into the lower eyelid.    LANTUS SOLOSTAR U-100 INSULIN glargine 100 units/mL SubQ pen INJECT 45 UNITS SUBCUTANEOUS EVERY DAY    levothyroxine (SYNTHROID) 50 mcg, Before breakfast    lisinopriL (PRINIVIL,ZESTRIL) 5 mg    metFORMIN (GLUCOPHAGE) 1,000 mg, 2 times daily    rosuvastatin (CRESTOR) 5 mg, Oral, Daily        PMH:  As per HPI and below:    Reviewed and updated in chart.    PAST MEDICAL HISTORY:  Past Medical History:   Diagnosis Date    Anxiety 07/26/2018    Chronic obstructive lung disease 07/26/2018    Depressive disorder 07/26/2018    Diabetes mellitus     Diverticular disease 07/26/2018    DVT (deep venous thrombosis)     Essential hypertension 09/26/2019    Gastroesophageal reflux disease 07/26/2018    Hypercholesterolemia 10/04/2019    Hypothyroidism 07/26/2018    Rheumatoid arthritis, unspecified     Smoker 05/14/2021        PAST SURGICAL HISTORY:  History reviewed. No pertinent surgical history.    SOCIAL HISTORY:  Social History[1]    FAMILY HISTORY:  Family History   Problem Relation Name Age of Onset    Arthritis Mother      Cancer Father          PROBLEM LIST:  Problem List[2]     PHYSICAL EXAM:      ED Triage Vitals [07/21/25 0746]   BP (!) 197/93   Pulse 85   Resp 16   Temp 98.2 °F (36.8 °C)   SpO2 98 %        Vital Signs: Reviewed As In Chart.  General:  Alert, No Cardiorespiratory Distress Noted.  Head: Normocephalic   Eye:  Pupils equal and reactive to light and accomodation. Extraocular Movements Are Intact.   ENT: Mucus membranes are moist.   Neck: Supple  Cardiovascular:  Regular Rate And Rhythm     Respiratory:  Clear to auscultation bilaterally    Gastrointestinal:  Soft, Non Distended, Non Tenderness, Normal Bowel Sounds.    Neurological:  Alert And Oriented To Person, Place, Time, And Situation, Normal  Motor Observed, Normal Speech Observed.  Back: Normal range of motion  Musculoskeletal:  No Gross Deformity Noted.   Genital: deferred    Psychiatric:  Cooperative.  Skin: warm, dry  Lymphatic: No lymphadenopathy      ED WORKUP FOR MEDICAL DECISION MAKING:    ED ORDERS:  Orders Placed This Encounter   Procedures    CT Renal Stone Study ABD Pelvis WO    Urinalysis, Reflex to Urine Culture    Urinalysis, Microscopic    CBC auto differential    Comprehensive metabolic panel    Protime-INR    APTT    CBC with Differential    Ambulatory referral/consult to Smoking Cessation Program    Insert Saline lock IV       ED MEDICINES:  Medications   sodium chloride 0.9% bolus 1,000 mL 1,000 mL (1,000 mLs Intravenous New Bag 7/21/25 0825)                ED LABS ORDERED AND REVIEWED:  Admission on 07/21/2025   Component Date Value Ref Range Status    Color, UA 07/21/2025 Red (A)  Yellow, Light-Yellow, Dark Yellow, Christina, Straw Final    Appearance, UA 07/21/2025 Cloudy (A)  Clear Final    Specific Gravity, UA 07/21/2025 1.020  1.005 - 1.030 Final    pH, UA 07/21/2025 6.5  5.0 - 8.5 Final    Protein, UA 07/21/2025 2+ (A)  Negative Final    Glucose, UA 07/21/2025 2+ (A)  Negative, Normal Final    Ketones, UA 07/21/2025 Negative  Negative Final    Blood, UA 07/21/2025 3+ (A)  Negative Final    Bilirubin, UA 07/21/2025 Negative  Negative Final    Urobilinogen, UA 07/21/2025 0.2  0.2, 1.0, Normal Final    Nitrites, UA 07/21/2025 Negative  Negative Final    Leukocyte Esterase, UA 07/21/2025 Negative  Negative Final    Bacteria, UA 07/21/2025 None Seen  None Seen, Rare, Occasional /HPF Final    RBC, UA 07/21/2025 50-99 (A)  None Seen, 0-2, 3-5, 0-5 /HPF Final    WBC, UA 07/21/2025 None Seen  None Seen, 0-2, 3-5, 0-5 /HPF Final    Squamous Epithelial Cells, UA 07/21/2025 None Seen  None Seen, Rare, Occasional, Occ /HPF Final    Sodium 07/21/2025 137  136 - 145 mmol/L Final    Potassium 07/21/2025 4.8  3.5 - 5.1 mmol/L Final    Chloride  07/21/2025 102  98 - 107 mmol/L Final    CO2 07/21/2025 28  22 - 29 mmol/L Final    Glucose 07/21/2025 361 (H)  74 - 100 mg/dL Final    Blood Urea Nitrogen 07/21/2025 17.0  8.4 - 25.7 mg/dL Final    Creatinine 07/21/2025 1.11  0.72 - 1.25 mg/dL Final    Calcium 07/21/2025 9.7  8.4 - 10.2 mg/dL Final    Protein Total 07/21/2025 6.9  6.4 - 8.3 gm/dL Final    Albumin 07/21/2025 3.3 (L)  3.5 - 5.0 g/dL Final    Globulin 07/21/2025 3.6 (H)  2.4 - 3.5 gm/dL Final    Albumin/Globulin Ratio 07/21/2025 0.9 (L)  1.1 - 2.0 ratio Final    Bilirubin Total 07/21/2025 0.5  <=1.5 mg/dL Final    ALP 07/21/2025 83  40 - 150 unit/L Final    ALT 07/21/2025 15  0 - 55 unit/L Final    AST 07/21/2025 13  11 - 45 unit/L Final    eGFR 07/21/2025 >60  mL/min/1.73/m2 Final    Anion Gap 07/21/2025 7.0  mEq/L Final    BUN/Creatinine Ratio 07/21/2025 15   Final    PT 07/21/2025 12.9  12.4 - 14.9 seconds Final    INR 07/21/2025 0.9  <=1.3 Final    PTT 07/21/2025 24.4  24.2 - 35.9 seconds Final    WBC 07/21/2025 8.62  4.50 - 11.50 x10(3)/mcL Final    RBC 07/21/2025 5.13  4.70 - 6.10 x10(6)/mcL Final    Hgb 07/21/2025 15.9  14.0 - 18.0 g/dL Final    Hct 07/21/2025 46.2  42.0 - 52.0 % Final    MCV 07/21/2025 90.1  80.0 - 94.0 fL Final    MCH 07/21/2025 31.0  27.0 - 31.0 pg Final    MCHC 07/21/2025 34.4  33.0 - 36.0 g/dL Final    RDW 07/21/2025 11.8  11.5 - 17.0 % Final    Platelet 07/21/2025 159  130 - 400 x10(3)/mcL Final    MPV 07/21/2025 12.2 (H)  7.4 - 10.4 fL Final    Neut % 07/21/2025 53.5  % Final    Lymph % 07/21/2025 34.1  % Final    Mono % 07/21/2025 8.7  % Final    Eos % 07/21/2025 2.1  % Final    Basophil % 07/21/2025 1.0  % Final    Imm Grans % 07/21/2025 0.6  % Final    Neut # 07/21/2025 4.61  2.1 - 9.2 x10(3)/mcL Final    Lymph # 07/21/2025 2.94  0.6 - 4.6 x10(3)/mcL Final    Mono # 07/21/2025 0.75  0.1 - 1.3 x10(3)/mcL Final    Eos # 07/21/2025 0.18  0 - 0.9 x10(3)/mcL Final    Baso # 07/21/2025 0.09  <=0.2 x10(3)/mcL Final    Imm  Gran # 07/21/2025 0.05 (H)  0.00 - 0.04 x10(3)/mcL Final    NRBC% 07/21/2025 0.0  % Final       RADIOLOGY STUDIES ORDERED AND REVIEWED:  Imaging Results              CT Renal Stone Study ABD Pelvis WO (Final result)  Result time 07/21/25 09:09:46      Final result by Freddie Fox MD (07/21/25 09:09:46)                   Impression:      1. No obstructive uropathy identified  2. Suspect poorly visualized cyst posteroinferior left kidney measuring 1.2 cm.  Sonogram would allow further evaluation.  3. Findings of constipation  4. Mild diverticulosis coli  5. Findings and other details as above      Electronically signed by: Freddie Fox  Date:    07/21/2025  Time:    09:09               Narrative:    EXAMINATION:  CT RENAL STONE STUDY ABD PELVIS WO    CLINICAL HISTORY:  Flank pain, kidney stone suspected;, .    TECHNIQUE:  PATIENT RADIATION DOSE: DLP(mGycm) 252    As per PQRS measures, all CT scans at this facility used dose modulation, iterative reconstruction, and/or weight based dose adjustment when appropriate to reduce radiation dose to as low as reasonably achievable.    COMPARISON:  None available    FINDINGS:  Serial axial images obtained of the abdomen without the administration of IV or oral contrast.  Additional sagittal and coronal reconstructions were performed.Degenerative changes are noted to the thoracolumbar spine.  The heart is normal in size.  Mild atelectasis and/or scarring is evident the lung bases.  The liver, gallbladder, spleen, adrenal glands, and pancreas are grossly within normal limits without the administration of IV contrast.  There is mucosal prominence versus underdistention at the stomach.  Atherosclerosis seen within the aorta and branching vessels.  The kidneys are relatively symmetric in size.  No hydronephrosis is seen.  No renal or ureteral stone is identified.  There is a faint small round low-attenuation focus at the posterior lower left kidney measuring 1.2 cm suspicious  for poorly visualized cyst.  There is mild bilateral perinephric stranding.  Few small lymph nodes are seen within the periaortic and pararcaval region.  No dilated loops of bowel are identified.  Feces is evident throughout the majority of the colon including the rectum.  The appendix is within normal limits.  No dilated loops of bowel are identified.  A small fatty umbilical hernia is present.  The bladder is distended with fluid.  The prostate is normal in size.  A few scattered diverticula are noted to the descending and sigmoid colon.                                      MEDICAL DECISION MAKING:    Oz Sher Jr. is 55 y.o. male who  has a past medical history of Anxiety (07/26/2018), Chronic obstructive lung disease (07/26/2018), Depressive disorder (07/26/2018), Diabetes mellitus, Diverticular disease (07/26/2018), DVT (deep venous thrombosis), Essential hypertension (09/26/2019), Gastroesophageal reflux disease (07/26/2018), Hypercholesterolemia (10/04/2019), Hypothyroidism (07/26/2018), Rheumatoid arthritis, unspecified, and Smoker (05/14/2021). arrives in ER with c/o Flank Pain (Right flank pain and blood in urine that started last night)      Reviewed Nurses Note. Reviewed Vital Signs.     Reviewed Pertinent old records, History and updated as necessary.    Vitals:    07/21/25 0748   BP: (!) 165/90   Pulse:    Resp:    Temp:         Medical Decision Making  Differential diagnosis includes but is not limited to appendicitis, bowel obstruction, bowel perforation, renal stone, biliary colic, pancreatitis, urinary tract infection, gastroesophageal reflux disease, gastritis, peptic ulcer disease, abdominal aortic aneurism, diverticulitis.    Amount and/or Complexity of Data Reviewed  Labs: ordered.     Details: Urine positive for blood no white cells or bacteria otherwise labs unremarkable  Radiology: ordered.                        PROCEDURES PERFORMED IN ED:  Procedures    DIAGNOSTIC IMPRESSION:         ICD-10-CM ICD-9-CM   1. Gross hematuria  R31.0 599.71               Medication List        ASK your doctor about these medications      * albuterol 90 mcg/actuation inhaler  Commonly known as: PROVENTIL/VENTOLIN HFA     * albuterol 2.5 mg /3 mL (0.083 %) nebulizer solution  Commonly known as: PROVENTIL     azelastine-fluticasone 137-50 mcg/spray Spry nassal spray  Commonly known as: DYMISTA  1 spray by Each Nostril route 2 (two) times daily.     baclofen 10 MG tablet  Commonly known as: LIORESAL  Take 1 tablet (10 mg total) by mouth 3 (three) times daily. for 7 days     ELIQUIS 5 mg Tab  Generic drug: apixaban     erythromycin ophthalmic ointment  Commonly known as: ROMYCIN  Place a 1/2 inch ribbon of ointment into the lower eyelid.     LANTUS SOLOSTAR U-100 INSULIN 100 unit/mL (3 mL) Inpn pen  Generic drug: insulin glargine U-100 (Lantus)     levothyroxine 50 MCG tablet  Commonly known as: SYNTHROID     lisinopriL 5 MG tablet  Commonly known as: PRINIVIL,ZESTRIL     metFORMIN 1000 MG tablet  Commonly known as: GLUCOPHAGE     rosuvastatin 5 MG tablet  Commonly known as: CRESTOR           * This list has 2 medication(s) that are the same as other medications prescribed for you. Read the directions carefully, and ask your doctor or other care provider to review them with you.                    Follow-up Information       Primary care physician In 2 days.               Please follow up.    Contact information:  Urologist of choice in 1-2 days                                       [1]   Social History  Tobacco Use    Smoking status: Every Day     Current packs/day: 1.00     Average packs/day: 1 pack/day for 29.6 years (29.6 ttl pk-yrs)     Types: Cigarettes     Start date: 1996    Smokeless tobacco: Never   Vaping Use    Vaping status: Never Used   Substance Use Topics    Alcohol use: Never    Drug use: Never   [2]   Patient Active Problem List  Diagnosis    Anxiety    Chronic cough    Chronic obstructive lung disease     Depressive disorder    Diverticular disease    Essential hypertension    Hypertension    Gastroesophageal reflux disease    Hypercholesterolemia    Hypothyroidism    Smoker    Degenerative disc disease, cervical    Cervicalgia    Left arm pain    Numbness and tingling        Rubén Bright MD  07/21/25 8754

## 2025-07-21 NOTE — FIRST PROVIDER EVALUATION
"Medical screening examination initiated.  I have conducted a focused provider triage encounter, findings are as follows:    Brief history of present illness:  55-year-old male presents to ED for evaluation of hematuria.  States he has had hematuria since last night.  Patient is currently on Eliquis due to history of DVTs.  Seen at outpatient ED this morning with a negative workup and discharge.  States he has continued to have hematuria prompting him to come back to ED    Vitals:    07/21/25 1339   BP: 131/84   BP Location: Left arm   Pulse: 88   Resp: 20   Temp: 98.3 °F (36.8 °C)   TempSrc: Oral   SpO2: 98%   Weight: 79.4 kg (175 lb)   Height: 5' 9" (1.753 m)       Pertinent physical exam:  Patient awake alert and oriented.  Ambulatory into triage.  Anxious    Brief workup plan:  Labs, UA, type and screen    Preliminary workup initiated; this workup will be continued and followed by the physician or advanced practice provider that is assigned to the patient when roomed.  "

## 2025-07-23 ENCOUNTER — HOSPITAL ENCOUNTER (EMERGENCY)
Facility: HOSPITAL | Age: 55
Discharge: HOME | End: 2025-07-23
Attending: EMERGENCY MEDICINE
Payer: MEDICAID

## 2025-07-23 VITALS
BODY MASS INDEX: 25.92 KG/M2 | OXYGEN SATURATION: 100 % | DIASTOLIC BLOOD PRESSURE: 76 MMHG | TEMPERATURE: 98 F | SYSTOLIC BLOOD PRESSURE: 134 MMHG | HEART RATE: 76 BPM | WEIGHT: 175 LBS | RESPIRATION RATE: 16 BRPM | HEIGHT: 69 IN

## 2025-07-23 DIAGNOSIS — R31.9 HEMATURIA, UNSPECIFIED TYPE: Primary | ICD-10-CM

## 2025-07-23 DIAGNOSIS — R73.9 HYPERGLYCEMIA: ICD-10-CM

## 2025-07-23 LAB
ALBUMIN SERPL-MCNC: 3.5 G/DL (ref 3.5–5)
ALBUMIN/GLOB SERPL: 1 RATIO (ref 1.1–2)
ALP SERPL-CCNC: 85 UNIT/L (ref 40–150)
ALT SERPL-CCNC: 14 UNIT/L (ref 0–55)
ANION GAP SERPL CALC-SCNC: 8 MEQ/L
AST SERPL-CCNC: 11 UNIT/L (ref 11–45)
BACTERIA #/AREA URNS AUTO: ABNORMAL /HPF
BASOPHILS # BLD AUTO: 0.09 X10(3)/MCL
BASOPHILS NFR BLD AUTO: 1 %
BILIRUB SERPL-MCNC: 0.6 MG/DL
BILIRUB UR QL STRIP.AUTO: NEGATIVE
BUN SERPL-MCNC: 15.2 MG/DL (ref 8.4–25.7)
CALCIUM SERPL-MCNC: 9.2 MG/DL (ref 8.4–10.2)
CHLORIDE SERPL-SCNC: 102 MMOL/L (ref 98–107)
CLARITY UR: CLEAR
CO2 SERPL-SCNC: 25 MMOL/L (ref 22–29)
COLOR UR AUTO: YELLOW
CREAT SERPL-MCNC: 1.05 MG/DL (ref 0.72–1.25)
CREAT/UREA NIT SERPL: 14
EOSINOPHIL # BLD AUTO: 0.1 X10(3)/MCL (ref 0–0.9)
EOSINOPHIL NFR BLD AUTO: 1.1 %
ERYTHROCYTE [DISTWIDTH] IN BLOOD BY AUTOMATED COUNT: 11.6 % (ref 11.5–17)
GFR SERPLBLD CREATININE-BSD FMLA CKD-EPI: >60 ML/MIN/1.73/M2
GLOBULIN SER-MCNC: 3.4 GM/DL (ref 2.4–3.5)
GLUCOSE SERPL-MCNC: 378 MG/DL (ref 74–100)
GLUCOSE UR QL STRIP: ABNORMAL
HCT VFR BLD AUTO: 45.1 % (ref 42–52)
HGB BLD-MCNC: 15.9 G/DL (ref 14–18)
HGB UR QL STRIP: ABNORMAL
IMM GRANULOCYTES # BLD AUTO: 0.04 X10(3)/MCL (ref 0–0.04)
IMM GRANULOCYTES NFR BLD AUTO: 0.4 %
KETONES UR QL STRIP: ABNORMAL
LEUKOCYTE ESTERASE UR QL STRIP: NEGATIVE
LYMPHOCYTES # BLD AUTO: 2.8 X10(3)/MCL (ref 0.6–4.6)
LYMPHOCYTES NFR BLD AUTO: 30.4 %
MCH RBC QN AUTO: 31.4 PG (ref 27–31)
MCHC RBC AUTO-ENTMCNC: 35.3 G/DL (ref 33–36)
MCV RBC AUTO: 89.1 FL (ref 80–94)
MONOCYTES # BLD AUTO: 0.61 X10(3)/MCL (ref 0.1–1.3)
MONOCYTES NFR BLD AUTO: 6.6 %
NEUTROPHILS # BLD AUTO: 5.57 X10(3)/MCL (ref 2.1–9.2)
NEUTROPHILS NFR BLD AUTO: 60.5 %
NITRITE UR QL STRIP: NEGATIVE
NRBC BLD AUTO-RTO: 0 %
PH UR STRIP: 5.5 [PH]
PLATELET # BLD AUTO: 167 X10(3)/MCL (ref 130–400)
PMV BLD AUTO: 12.4 FL (ref 7.4–10.4)
POCT GLUCOSE: 318 MG/DL (ref 70–110)
POTASSIUM SERPL-SCNC: 4.7 MMOL/L (ref 3.5–5.1)
PROT SERPL-MCNC: 6.9 GM/DL (ref 6.4–8.3)
PROT UR QL STRIP: ABNORMAL
RBC # BLD AUTO: 5.06 X10(6)/MCL (ref 4.7–6.1)
RBC #/AREA URNS AUTO: ABNORMAL /HPF
SODIUM SERPL-SCNC: 135 MMOL/L (ref 136–145)
SP GR UR STRIP.AUTO: 1.03 (ref 1–1.03)
SQUAMOUS #/AREA URNS LPF: ABNORMAL /HPF
UROBILINOGEN UR STRIP-ACNC: NORMAL
WBC # BLD AUTO: 9.21 X10(3)/MCL (ref 4.5–11.5)
WBC #/AREA URNS AUTO: ABNORMAL /HPF

## 2025-07-23 PROCEDURE — 25000003 PHARM REV CODE 250: Performed by: NURSE PRACTITIONER

## 2025-07-23 PROCEDURE — 85025 COMPLETE CBC W/AUTO DIFF WBC: CPT

## 2025-07-23 PROCEDURE — 99284 EMERGENCY DEPT VISIT MOD MDM: CPT | Mod: 25

## 2025-07-23 PROCEDURE — 96360 HYDRATION IV INFUSION INIT: CPT

## 2025-07-23 PROCEDURE — 82962 GLUCOSE BLOOD TEST: CPT

## 2025-07-23 PROCEDURE — 80053 COMPREHEN METABOLIC PANEL: CPT

## 2025-07-23 PROCEDURE — 81001 URINALYSIS AUTO W/SCOPE: CPT

## 2025-07-23 RX ADMIN — SODIUM CHLORIDE 1000 ML: 9 INJECTION, SOLUTION INTRAVENOUS at 04:07

## 2025-07-23 NOTE — FIRST PROVIDER EVALUATION
"Medical screening examination initiated.  I have conducted a focused provider triage encounter, findings are as follows:    Brief history of present illness:  arrived to ED due to painless hematuria. Seen twice in ER on 07/21/25; instructed to f/u with urology. Patient now having blood clots in urine. +BT use.     Vitals:    07/23/25 1311   BP: (!) 166/81   Pulse: 89   Resp: 16   Temp: 98.4 °F (36.9 °C)   TempSrc: Oral   SpO2: 98%   Weight: 79.4 kg (175 lb)   Height: 5' 9" (1.753 m)       Pertinent physical exam:  awake, alert, has non-labored breathing, ambulatory.    Brief workup plan:  labs     Preliminary workup initiated; this workup will be continued and followed by the physician or advanced practice provider that is assigned to the patient when roomed.  "

## 2025-07-23 NOTE — ED PROVIDER NOTES
Encounter Date: 7/23/2025       History     Chief Complaint   Patient presents with    Hematuria     Pt presents pov, ambulatory to triage. Seen here x2 days ago for same and directed to come back if clots present in urine. Pt states he began to have clots in urine last night. On eliquis.      See MDM    The history is provided by the patient. No  was used.     Review of patient's allergies indicates:  No Known Allergies  Past Medical History:   Diagnosis Date    Anxiety 07/26/2018    Chronic obstructive lung disease 07/26/2018    Depressive disorder 07/26/2018    Diabetes mellitus     Diverticular disease 07/26/2018    DVT (deep venous thrombosis)     Essential hypertension 09/26/2019    Gastroesophageal reflux disease 07/26/2018    Hypercholesterolemia 10/04/2019    Hypothyroidism 07/26/2018    Rheumatoid arthritis, unspecified     Smoker 05/14/2021     History reviewed. No pertinent surgical history.  Family History   Problem Relation Name Age of Onset    Arthritis Mother      Cancer Father       Social History[1]  Review of Systems   Constitutional:  Negative for fever.   Respiratory:  Negative for cough and shortness of breath.    Cardiovascular:  Negative for chest pain.   Gastrointestinal:  Negative for abdominal pain.   Genitourinary:  Positive for hematuria. Negative for difficulty urinating and dysuria.   Musculoskeletal:  Negative for gait problem.   Skin:  Negative for color change.   Neurological:  Negative for dizziness, speech difficulty and headaches.   Psychiatric/Behavioral:  Negative for hallucinations and suicidal ideas.    All other systems reviewed and are negative.      Physical Exam     Initial Vitals [07/23/25 1311]   BP Pulse Resp Temp SpO2   (!) 166/81 89 16 98.4 °F (36.9 °C) 98 %      MAP       --         Physical Exam    Nursing note and vitals reviewed.  Constitutional: He appears well-developed and well-nourished.   HENT:   Head: Normocephalic.   Eyes: EOM are  normal.   Neck: Neck supple.   Normal range of motion.  Cardiovascular:  Normal rate, regular rhythm, normal heart sounds and intact distal pulses.           Pulmonary/Chest: Breath sounds normal.   Abdominal: Abdomen is soft. Bowel sounds are normal.   Musculoskeletal:         General: Normal range of motion.      Cervical back: Normal range of motion and neck supple.     Neurological: He is alert and oriented to person, place, and time. He has normal strength.   Skin: Skin is warm and dry. Capillary refill takes less than 2 seconds.   Psychiatric: He has a normal mood and affect. His behavior is normal. Judgment and thought content normal.         ED Course   Procedures  Labs Reviewed   COMPREHENSIVE METABOLIC PANEL - Abnormal       Result Value    Sodium 135 (*)     Potassium 4.7      Chloride 102      CO2 25      Glucose 378 (*)     Blood Urea Nitrogen 15.2      Creatinine 1.05      Calcium 9.2      Protein Total 6.9      Albumin 3.5      Globulin 3.4      Albumin/Globulin Ratio 1.0 (*)     Bilirubin Total 0.6      ALP 85      ALT 14      AST 11      eGFR >60      Anion Gap 8.0      BUN/Creatinine Ratio 14     URINALYSIS, REFLEX TO URINE CULTURE - Abnormal    Color, UA Yellow      Appearance, UA Clear      Specific Gravity, UA 1.029      pH, UA 5.5      Protein, UA Trace (*)     Glucose, UA 4+ (*)     Ketones, UA Trace (*)     Blood, UA 2+ (*)     Bilirubin, UA Negative      Urobilinogen, UA Normal      Nitrites, UA Negative      Leukocyte Esterase, UA Negative      RBC, UA 11-20 (*)     WBC, UA 0-5      Bacteria, UA None Seen      Squamous Epithelial Cells, UA Trace     CBC WITH DIFFERENTIAL - Abnormal    WBC 9.21      RBC 5.06      Hgb 15.9      Hct 45.1      MCV 89.1      MCH 31.4 (*)     MCHC 35.3      RDW 11.6      Platelet 167      MPV 12.4 (*)     Neut % 60.5      Lymph % 30.4      Mono % 6.6      Eos % 1.1      Basophil % 1.0      Imm Grans % 0.4      Neut # 5.57      Lymph # 2.80      Mono # 0.61       Eos # 0.10      Baso # 0.09      Imm Gran # 0.04      NRBC% 0.0     CBC W/ AUTO DIFFERENTIAL    Narrative:     The following orders were created for panel order CBC auto differential.  Procedure                               Abnormality         Status                     ---------                               -----------         ------                     CBC with Differential[[id number]]       Abnormal            Final result                 Please view results for these tests on the individual orders.          Imaging Results              US Retroperitoneal Complete (Final result)  Result time 07/23/25 15:59:34      Final result by Jovani Simpson MD (07/23/25 15:59:34)                   Impression:      No hydronephrosis.      Electronically signed by: Jovani Simpson  Date:    07/23/2025  Time:    15:59               Narrative:    EXAMINATION:  US RETROPERITONEAL COMPLETE    CLINICAL HISTORY:  gross hematuria, renal cyst on CT, sonogram recommended;    COMPARISON:  CT 21 July 2025    FINDINGS:  Grayscale and color Doppler sonographic evaluation of the kidneys and urinary bladder.    The right kidney measures 9-10 cm. The left kidney measures 11-12 cm.   No hydronephrosis.  There is a small left renal cyst for which no follow-up is needed.    No significant abnormality of the urinary bladder.                                       Medications   sodium chloride 0.9% bolus 1,000 mL 1,000 mL (1,000 mLs Intravenous New Bag 7/23/25 7008)     Medical Decision Making  Historian: Patient.  Patient is a White 55 y.o. male that presents with hematuria that has been present few days. Associated symptoms nothing. Surrounding information is patient was seen here recently for hematuria.  He was set up with Urology.  He is currently on Eliquis. Exacerbated by nothing. Relieved by nothing. Patient treatment prior to arrival none. Risk factors include none. Other history pertaining to this complaint nothing.   Assessment:  See  physical exam.  DD:  Hematuria  ED Course: History was obtained.  Physical was performed.  Patient states urine has improved. Had  1 clot last night. Medical or surgical consults:  Urology. Social determinants that affect healthcare:  None.       Amount and/or Complexity of Data Reviewed  Radiology: ordered.  Discussion of management or test interpretation with external provider(s): Discussed case with Dr Rooney, urology, can be d/c to follow up outpatient                                           Clinical Impression:  Final diagnoses:  [R31.9] Hematuria, unspecified type (Primary)  [R73.9] Hyperglycemia          ED Disposition Condition    Discharge Stable          ED Prescriptions    None       Follow-up Information       Follow up With Specialties Details Why Contact Info    Sha Rooney MD Urology  ed follow up- keep current appointment [address] [facility]  [address]  [facility] [id number]  [phone]                     [1]   Social History  Tobacco Use    Smoking status: Every Day     Current packs/day: 1.00     Average packs/day: 1 pack/day for 29.6 years (29.6 ttl pk-yrs)     Types: Cigarettes     Start date: 1996    Smokeless tobacco: Never   Vaping Use    Vaping status: Never Used   Substance Use Topics    Alcohol use: Never    Drug use: Never        Bart Sharif, MARIPOSAP  07/23/25 1790